# Patient Record
Sex: FEMALE | Race: BLACK OR AFRICAN AMERICAN | Employment: FULL TIME | URBAN - METROPOLITAN AREA
[De-identification: names, ages, dates, MRNs, and addresses within clinical notes are randomized per-mention and may not be internally consistent; named-entity substitution may affect disease eponyms.]

---
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
## 2022-03-08 ENCOUNTER — HOSPITAL ENCOUNTER (EMERGENCY)
Age: 22
Discharge: HOME | End: 2022-03-08
Payer: COMMERCIAL

## 2022-03-08 VITALS
RESPIRATION RATE: 16 BRPM | DIASTOLIC BLOOD PRESSURE: 82 MMHG | WEIGHT: 212 LBS | SYSTOLIC BLOOD PRESSURE: 142 MMHG | OXYGEN SATURATION: 100 % | TEMPERATURE: 100.7 F | HEART RATE: 120 BPM

## 2022-03-08 DIAGNOSIS — J10.1 INFLUENZA A H1N1 INFECTION: Primary | ICD-10-CM

## 2022-03-08 LAB
FLU A ANTIGEN: POSITIVE
INFLUENZA B AG, EIA: NEGATIVE

## 2022-03-08 PROCEDURE — 99202 OFFICE O/P NEW SF 15 MIN: CPT | Performed by: NURSE PRACTITIONER

## 2022-03-08 PROCEDURE — 99203 OFFICE O/P NEW LOW 30 MIN: CPT

## 2022-03-08 PROCEDURE — 87804 INFLUENZA ASSAY W/OPTIC: CPT

## 2022-03-08 RX ORDER — OSELTAMIVIR PHOSPHATE 75 MG/1
75 CAPSULE ORAL 2 TIMES DAILY
Qty: 10 CAPSULE | Refills: 0 | Status: SHIPPED | OUTPATIENT
Start: 2022-03-08 | End: 2022-03-13

## 2022-03-08 RX ORDER — DIPHENHYDRAMINE HYDROCHLORIDE 25 MG/1
25 CAPSULE ORAL DAILY
Qty: 30 TABLET | Refills: 3 | Status: ON HOLD | OUTPATIENT
Start: 2022-03-08 | End: 2022-05-12

## 2022-03-08 ASSESSMENT — PAIN DESCRIPTION - FREQUENCY: FREQUENCY: CONTINUOUS

## 2022-03-08 ASSESSMENT — ENCOUNTER SYMPTOMS
ABDOMINAL PAIN: 0
WHEEZING: 0
SINUS CONGESTION: 0
COLOR CHANGE: 0
CONSTIPATION: 0
SORE THROAT: 0
EYE DISCHARGE: 0
SINUS PRESSURE: 0
VOMITING: 1
RHINORRHEA: 1
NAUSEA: 1
DIARRHEA: 0
SHORTNESS OF BREATH: 0
COUGH: 1
CHEST TIGHTNESS: 0
SINUS PAIN: 0

## 2022-03-08 ASSESSMENT — PAIN DESCRIPTION - LOCATION: LOCATION: HEAD

## 2022-03-08 ASSESSMENT — PAIN DESCRIPTION - PAIN TYPE: TYPE: ACUTE PAIN

## 2022-03-08 ASSESSMENT — PAIN SCALES - GENERAL: PAINLEVEL_OUTOF10: 3

## 2022-03-08 ASSESSMENT — PAIN DESCRIPTION - DESCRIPTORS: DESCRIPTORS: ACHING

## 2022-03-08 NOTE — ED NOTES
Pt verbalized discharge instructions. Pt informed to go to ER if develop chest pain, shortness of breath or abdominal pain. Pt ambulatory out in stable condition. Assessment unchanged.        Leverette Homans, RN  03/08/22 5298

## 2022-03-08 NOTE — ED PROVIDER NOTES
[facility]  Urgent Care Encounter       CHIEF COMPLAINT       Chief Complaint   Patient presents with    Cough    Headache       Nurses Notes reviewed and I agree except as noted in the HPI. HISTORY OF PRESENT ILLNESS   Opal Arshad is a 24 y.o. female who presents     The history is provided by the patient. No  was used. Cough  Cough characteristics:  Productive and harsh  Sputum characteristics:  Green  Severity:  Moderate  Smoker: no    Context: sick contacts    Context: not exposure to allergens and not occupational exposure    Relieved by:  Nothing  Worsened by:  Nothing  Ineffective treatments:  Cough suppressants and fluids  Associated symptoms: chills, diaphoresis, fever, headaches and rhinorrhea    Associated symptoms: no chest pain, no ear fullness, no ear pain, no eye discharge, no myalgias, no rash, no shortness of breath, no sinus congestion, no sore throat and no wheezing    Fever:     Duration:  2 days    Temp source:  Subjective    Progression:  Waxing and waning      REVIEW OF SYSTEMS     Review of Systems   Constitutional: Positive for chills, diaphoresis, fatigue and fever. HENT: Positive for congestion and rhinorrhea. Negative for ear discharge, ear pain, postnasal drip, sinus pressure, sinus pain, sneezing and sore throat. Eyes: Negative for discharge. Respiratory: Positive for cough. Negative for chest tightness, shortness of breath and wheezing. Cardiovascular: Negative for chest pain, palpitations and leg swelling. Gastrointestinal: Positive for nausea and vomiting. Negative for abdominal pain, constipation and diarrhea. Genitourinary: Negative for pelvic pain, vaginal bleeding, vaginal discharge and vaginal pain. Musculoskeletal: Negative for arthralgias and myalgias. Skin: Negative for color change and rash. Neurological: Positive for headaches. Negative for dizziness and light-headedness.    All other systems reviewed and are negative. PAST MEDICAL HISTORY         Diagnosis Date    Allergic     Hypertension        SURGICALHISTORY     Patient  has a past surgical history that includes Eye surgery (Left, 2002). CURRENT MEDICATIONS       Discharge Medication List as of 3/8/2022  4:04 PM      CONTINUE these medications which have NOT CHANGED    Details   Prenatal Vit-Fe Fumarate-FA (PRENATAL 1+1 PO) Take by mouthHistorical Med             ALLERGIES     Patient is is allergic to bactrim [sulfamethoxazole-trimethoprim], lisinopril, and seasonal.    Patients   There is no immunization history on file for this patient. FAMILY HISTORY     Patient's family history includes Diabetes in an other family member; Heart Disease in her maternal grandfather; High Blood Pressure in her father, paternal grandmother, and another family member. SOCIAL HISTORY     Patient  reports that she has never smoked. She has never used smokeless tobacco. She reports that she does not drink alcohol and does not use drugs. PHYSICAL EXAM     ED TRIAGE VITALS  BP: (!) 142/82, Temp: 100.7 °F (38.2 °C), Pulse: 120, Resp: 16, SpO2: 100 %,Estimated body mass index is 34.23 kg/m² as calculated from the following:    Height as of 5/8/17: 5' 4.25\" (1.632 m). Weight as of 5/8/17: 201 lb (91.2 kg). ,No LMP recorded. Patient is pregnant. Physical Exam  Constitutional:       General: She is not in acute distress. Appearance: Normal appearance. She is obese. She is not ill-appearing, toxic-appearing or diaphoretic. HENT:      Head: Normocephalic. Right Ear: Tympanic membrane, ear canal and external ear normal.      Left Ear: Tympanic membrane, ear canal and external ear normal.      Nose: Rhinorrhea present. Mouth/Throat:      Mouth: Mucous membranes are moist.      Pharynx: No oropharyngeal exudate or posterior oropharyngeal erythema. Eyes:      General:         Right eye: No discharge. Left eye: No discharge. Extraocular Movements: Extraocular movements intact. Pupils: Pupils are equal, round, and reactive to light. Cardiovascular:      Rate and Rhythm: Normal rate and regular rhythm. Heart sounds: Normal heart sounds. No murmur heard. No friction rub. No gallop. Pulmonary:      Effort: No respiratory distress. Breath sounds: Normal breath sounds. No stridor or decreased air movement. No wheezing, rhonchi or rales. Chest:      Chest wall: No tenderness. Abdominal:      General: Abdomen is flat. Bowel sounds are normal. There is no distension. Palpations: Abdomen is soft. Tenderness: There is no abdominal tenderness. Musculoskeletal:      Cervical back: Normal range of motion. Skin:     General: Skin is warm and dry. Capillary Refill: Capillary refill takes less than 2 seconds. Neurological:      General: No focal deficit present. Mental Status: She is alert and oriented to person, place, and time. Mental status is at baseline. Psychiatric:         Mood and Affect: Mood normal.         Behavior: Behavior normal.         Thought Content: Thought content normal.         Judgment: Judgment normal.         DIAGNOSTIC RESULTS     Labs:  Results for orders placed or performed during the hospital encounter of 03/08/22   Rapid influenza A/B antigens   Result Value Ref Range    Flu A Antigen Positive (A) NEGATIVE    Influenza B Ag, EIA Negative NEGATIVE       IMAGING:    No orders to display         EKG:      URGENT CARE COURSE:     Vitals:    03/08/22 1522   BP: (!) 142/82   Pulse: 120   Resp: 16   Temp: 100.7 °F (38.2 °C)   TempSrc: Oral   SpO2: 100%   Weight: 212 lb (96.2 kg)       Medications - No data to display         PROCEDURES:  None    FINAL IMPRESSION      1. Influenza A H1N1 infection          DISPOSITION/ PLAN      The patient was advised to drink plenty of fluids. They may take Tylenol for fever or body aches. Take prescribed medication as directed.   Refer to handout regarding OTC medications safe for pregnancy. Pt is advised to go to ER if symptoms worsen, new symptoms develop, high fever >102, vomiting, breathing difficulty, lethargy, chest pain or shortness of breath Dial 911. The patient or patient's representative is agreeable to the treatment plan they're advised to follow-up with her primary care provider in one week for reevaluation.       PATIENT REFERRED TO:  JAMILA Mueller CNP  [address] [facility] [id number]      DISCHARGE MEDICATIONS:  Discharge Medication List as of 3/8/2022  4:04 PM      START taking these medications    Details   oseltamivir (TAMIFLU) 75 MG capsule Take 1 capsule by mouth 2 times daily for 5 days, Disp-10 capsule, R-0Normal      vitamin B-6 (PYRIDOXINE) 25 MG tablet Take 1 tablet by mouth daily, Disp-30 tablet, R-3Normal             Discharge Medication List as of 3/8/2022  4:04 PM      STOP taking these medications       Norgestim-Eth Estrad Triphasic 0.18/0.215/0.25 MG-25 MCG TABS Comments:   Reason for Stopping:         loratadine (CLARITIN) 10 MG tablet Comments:   Reason for Stopping:         UNKNOWN TO PATIENT Comments:   Reason for Stopping:               Discharge Medication List as of 3/8/2022  4:04 PM          JAMILA Glaser CNP    (Please note that portions of this note were completed with a voice recognition program. Efforts were made to edit the dictations but occasionally words are mis-transcribed.)         JAMILA Glaser CNP  03/08/22 [id number]

## 2022-03-08 NOTE — ED TRIAGE NOTES
Pt ambulatory into esuc with c/o headache and cough for the past day. Pt states she is 27 weeks pregnant and has not called OB. Pt states baby has been active. Pt states headache pain 3.

## 2022-03-08 NOTE — Clinical Note
Lucinda Farias was seen and treated in our emergency department on 3/8/2022. She may return to work on 03/10/2022. If you have any questions or concerns, please don't hesitate to call.       JAMILA Sosa - CNP

## 2022-05-12 ENCOUNTER — APPOINTMENT (OUTPATIENT)
Dept: LABOR AND DELIVERY | Age: 22
DRG: 540 | End: 2022-05-12
Payer: COMMERCIAL

## 2022-05-12 ENCOUNTER — HOSPITAL ENCOUNTER (INPATIENT)
Age: 22
LOS: 4 days | Discharge: HOME | DRG: 540 | End: 2022-05-16
Attending: STUDENT IN AN ORGANIZED HEALTH CARE EDUCATION/TRAINING PROGRAM | Admitting: STUDENT IN AN ORGANIZED HEALTH CARE EDUCATION/TRAINING PROGRAM
Payer: COMMERCIAL

## 2022-05-12 DIAGNOSIS — Z98.891 S/P C-SECTION: Primary | ICD-10-CM

## 2022-05-12 PROBLEM — O16.3 HYPERTENSION COMPLICATING PREGNANCY, THIRD TRIMESTER: Status: ACTIVE | Noted: 2022-05-12

## 2022-05-12 PROBLEM — Z34.90 ENCOUNTER FOR ELECTIVE INDUCTION OF LABOR: Status: ACTIVE | Noted: 2022-05-12

## 2022-05-12 LAB
ABO: NORMAL
ALBUMIN SERPL-MCNC: 3.6 G/DL (ref 3.5–5.1)
ALP BLD-CCNC: 104 U/L (ref 38–126)
ALT SERPL-CCNC: 11 U/L (ref 11–66)
AMPHETAMINE+METHAMPHETAMINE URINE SCREEN: NEGATIVE
ANION GAP SERPL CALCULATED.3IONS-SCNC: 14 MEQ/L (ref 8–16)
ANTIBODY SCREEN: NORMAL
AST SERPL-CCNC: 20 U/L (ref 5–40)
BARBITURATE QUANTITATIVE URINE: NEGATIVE
BASOPHILS # BLD: 0.2 %
BASOPHILS ABSOLUTE: 0 THOU/MM3 (ref 0–0.1)
BENZODIAZEPINE QUANTITATIVE URINE: NEGATIVE
BILIRUB SERPL-MCNC: 0.2 MG/DL (ref 0.3–1.2)
BUN BLDV-MCNC: 8 MG/DL (ref 7–22)
CALCIUM SERPL-MCNC: 8.8 MG/DL (ref 8.5–10.5)
CANNABINOID QUANTITATIVE URINE: POSITIVE
CHLORIDE BLD-SCNC: 104 MEQ/L (ref 98–111)
CO2: 20 MEQ/L (ref 23–33)
COCAINE METABOLITE QUANTITATIVE URINE: NEGATIVE
CREAT SERPL-MCNC: 0.4 MG/DL (ref 0.4–1.2)
CREATININE URINE: 85.4 MG/DL
EOSINOPHIL # BLD: 0.8 %
EOSINOPHILS ABSOLUTE: 0.1 THOU/MM3 (ref 0–0.4)
ERYTHROCYTE [DISTWIDTH] IN BLOOD BY AUTOMATED COUNT: 13.6 % (ref 11.5–14.5)
ERYTHROCYTE [DISTWIDTH] IN BLOOD BY AUTOMATED COUNT: 38.6 FL (ref 35–45)
GFR SERPL CREATININE-BSD FRML MDRD: > 90 ML/MIN/1.73M2
GLUCOSE BLD-MCNC: 106 MG/DL (ref 70–108)
HCT VFR BLD CALC: 33.3 % (ref 37–47)
HEMOGLOBIN: 10.7 GM/DL (ref 12–16)
IMMATURE GRANS (ABS): 0.03 THOU/MM3 (ref 0–0.07)
IMMATURE GRANULOCYTES: 0.3 %
LYMPHOCYTES # BLD: 20.1 %
LYMPHOCYTES ABSOLUTE: 2.2 THOU/MM3 (ref 1–4.8)
MCH RBC QN AUTO: 25.6 PG (ref 26–33)
MCHC RBC AUTO-ENTMCNC: 32.1 GM/DL (ref 32.2–35.5)
MCV RBC AUTO: 79.7 FL (ref 81–99)
MONOCYTES # BLD: 5.7 %
MONOCYTES ABSOLUTE: 0.6 THOU/MM3 (ref 0.4–1.3)
NUCLEATED RED BLOOD CELLS: 0 /100 WBC
OPIATES, URINE: NEGATIVE
OXYCODONE: NEGATIVE
PHENCYCLIDINE QUANTITATIVE URINE: NEGATIVE
PLATELET # BLD: 287 THOU/MM3 (ref 130–400)
PMV BLD AUTO: 10.6 FL (ref 9.4–12.4)
POTASSIUM SERPL-SCNC: 3.5 MEQ/L (ref 3.5–5.2)
PROT/CREAT RATIO, UR: 0.18
PROTEIN, URINE: 15 MG/DL
RBC # BLD: 4.18 MILL/MM3 (ref 4.2–5.4)
RH FACTOR: NORMAL
SEG NEUTROPHILS: 72.9 %
SEGMENTED NEUTROPHILS ABSOLUTE COUNT: 8 THOU/MM3 (ref 1.8–7.7)
SODIUM BLD-SCNC: 138 MEQ/L (ref 135–145)
TOTAL PROTEIN: 6 G/DL (ref 6.1–8)
WBC # BLD: 11 THOU/MM3 (ref 4.8–10.8)

## 2022-05-12 PROCEDURE — 80307 DRUG TEST PRSMV CHEM ANLYZR: CPT

## 2022-05-12 PROCEDURE — 85025 COMPLETE CBC W/AUTO DIFF WBC: CPT

## 2022-05-12 PROCEDURE — 82570 ASSAY OF URINE CREATININE: CPT

## 2022-05-12 PROCEDURE — 86900 BLOOD TYPING SEROLOGIC ABO: CPT

## 2022-05-12 PROCEDURE — 1220000001 HC SEMI PRIVATE L&D R&B

## 2022-05-12 PROCEDURE — 84156 ASSAY OF PROTEIN URINE: CPT

## 2022-05-12 PROCEDURE — 6370000000 HC RX 637 (ALT 250 FOR IP): Performed by: STUDENT IN AN ORGANIZED HEALTH CARE EDUCATION/TRAINING PROGRAM

## 2022-05-12 PROCEDURE — 2580000003 HC RX 258: Performed by: STUDENT IN AN ORGANIZED HEALTH CARE EDUCATION/TRAINING PROGRAM

## 2022-05-12 PROCEDURE — 86850 RBC ANTIBODY SCREEN: CPT

## 2022-05-12 PROCEDURE — 86901 BLOOD TYPING SEROLOGIC RH(D): CPT

## 2022-05-12 PROCEDURE — 80053 COMPREHEN METABOLIC PANEL: CPT

## 2022-05-12 PROCEDURE — 6360000002 HC RX W HCPCS

## 2022-05-12 PROCEDURE — 6360000002 HC RX W HCPCS: Performed by: STUDENT IN AN ORGANIZED HEALTH CARE EDUCATION/TRAINING PROGRAM

## 2022-05-12 PROCEDURE — 2500000003 HC RX 250 WO HCPCS: Performed by: STUDENT IN AN ORGANIZED HEALTH CARE EDUCATION/TRAINING PROGRAM

## 2022-05-12 PROCEDURE — 86592 SYPHILIS TEST NON-TREP QUAL: CPT

## 2022-05-12 RX ORDER — ONDANSETRON 2 MG/ML
8 INJECTION INTRAMUSCULAR; INTRAVENOUS EVERY 6 HOURS PRN
Status: DISCONTINUED | OUTPATIENT
Start: 2022-05-12 | End: 2022-05-14 | Stop reason: HOSPADM

## 2022-05-12 RX ORDER — IBUPROFEN 800 MG/1
800 TABLET ORAL EVERY 8 HOURS PRN
Status: DISCONTINUED | OUTPATIENT
Start: 2022-05-12 | End: 2022-05-16 | Stop reason: HOSPADM

## 2022-05-12 RX ORDER — SODIUM CHLORIDE, SODIUM LACTATE, POTASSIUM CHLORIDE, CALCIUM CHLORIDE 600; 310; 30; 20 MG/100ML; MG/100ML; MG/100ML; MG/100ML
INJECTION, SOLUTION INTRAVENOUS CONTINUOUS
Status: DISCONTINUED | OUTPATIENT
Start: 2022-05-12 | End: 2022-05-14

## 2022-05-12 RX ORDER — SODIUM CHLORIDE 0.9 % (FLUSH) 0.9 %
5-40 SYRINGE (ML) INJECTION PRN
Status: DISCONTINUED | OUTPATIENT
Start: 2022-05-12 | End: 2022-05-14

## 2022-05-12 RX ORDER — BUTORPHANOL TARTRATE 1 MG/ML
1 INJECTION, SOLUTION INTRAMUSCULAR; INTRAVENOUS
Status: DISCONTINUED | OUTPATIENT
Start: 2022-05-12 | End: 2022-05-14 | Stop reason: HOSPADM

## 2022-05-12 RX ORDER — SEVOFLURANE 250 ML/250ML
1 LIQUID RESPIRATORY (INHALATION) CONTINUOUS PRN
Status: DISCONTINUED | OUTPATIENT
Start: 2022-05-12 | End: 2022-05-14 | Stop reason: HOSPADM

## 2022-05-12 RX ORDER — SODIUM CHLORIDE, SODIUM LACTATE, POTASSIUM CHLORIDE, AND CALCIUM CHLORIDE .6; .31; .03; .02 G/100ML; G/100ML; G/100ML; G/100ML
1000 INJECTION, SOLUTION INTRAVENOUS PRN
Status: DISCONTINUED | OUTPATIENT
Start: 2022-05-12 | End: 2022-05-14 | Stop reason: HOSPADM

## 2022-05-12 RX ORDER — CARBOPROST TROMETHAMINE 250 UG/ML
250 INJECTION, SOLUTION INTRAMUSCULAR PRN
Status: DISCONTINUED | OUTPATIENT
Start: 2022-05-12 | End: 2022-05-14 | Stop reason: HOSPADM

## 2022-05-12 RX ORDER — LABETALOL 100 MG/1
100 TABLET, FILM COATED ORAL 2 TIMES DAILY
Status: ON HOLD | COMMUNITY
End: 2022-05-16 | Stop reason: HOSPADM

## 2022-05-12 RX ORDER — LABETALOL 20 MG/4 ML (5 MG/ML) INTRAVENOUS SYRINGE
40
Status: COMPLETED | OUTPATIENT
Start: 2022-05-12 | End: 2022-05-12

## 2022-05-12 RX ORDER — SODIUM CHLORIDE, SODIUM LACTATE, POTASSIUM CHLORIDE, AND CALCIUM CHLORIDE .6; .31; .03; .02 G/100ML; G/100ML; G/100ML; G/100ML
500 INJECTION, SOLUTION INTRAVENOUS PRN
Status: DISCONTINUED | OUTPATIENT
Start: 2022-05-12 | End: 2022-05-14 | Stop reason: HOSPADM

## 2022-05-12 RX ORDER — SODIUM CHLORIDE, SODIUM LACTATE, POTASSIUM CHLORIDE, CALCIUM CHLORIDE 600; 310; 30; 20 MG/100ML; MG/100ML; MG/100ML; MG/100ML
INJECTION, SOLUTION INTRAVENOUS CONTINUOUS
Status: DISCONTINUED | OUTPATIENT
Start: 2022-05-12 | End: 2022-05-13

## 2022-05-12 RX ORDER — METHYLERGONOVINE MALEATE 0.2 MG/ML
200 INJECTION INTRAVENOUS PRN
Status: DISCONTINUED | OUTPATIENT
Start: 2022-05-12 | End: 2022-05-14 | Stop reason: HOSPADM

## 2022-05-12 RX ORDER — ACETAMINOPHEN 325 MG/1
650 TABLET ORAL EVERY 4 HOURS PRN
Status: DISCONTINUED | OUTPATIENT
Start: 2022-05-12 | End: 2022-05-14 | Stop reason: HOSPADM

## 2022-05-12 RX ORDER — PENICILLIN G 3000000 [IU]/50ML
3 INJECTION, SOLUTION INTRAVENOUS EVERY 4 HOURS
Status: DISCONTINUED | OUTPATIENT
Start: 2022-05-12 | End: 2022-05-14 | Stop reason: HOSPADM

## 2022-05-12 RX ORDER — MORPHINE SULFATE 2 MG/ML
2 INJECTION, SOLUTION INTRAMUSCULAR; INTRAVENOUS
Status: DISCONTINUED | OUTPATIENT
Start: 2022-05-12 | End: 2022-05-14 | Stop reason: HOSPADM

## 2022-05-12 RX ORDER — LIDOCAINE HYDROCHLORIDE 10 MG/ML
30 INJECTION, SOLUTION EPIDURAL; INFILTRATION; INTRACAUDAL; PERINEURAL PRN
Status: DISCONTINUED | OUTPATIENT
Start: 2022-05-12 | End: 2022-05-14 | Stop reason: HOSPADM

## 2022-05-12 RX ORDER — LABETALOL 200 MG/1
200 TABLET, FILM COATED ORAL EVERY 12 HOURS SCHEDULED
Status: DISCONTINUED | OUTPATIENT
Start: 2022-05-12 | End: 2022-05-16 | Stop reason: HOSPADM

## 2022-05-12 RX ORDER — MORPHINE SULFATE 4 MG/ML
4 INJECTION, SOLUTION INTRAMUSCULAR; INTRAVENOUS
Status: DISCONTINUED | OUTPATIENT
Start: 2022-05-12 | End: 2022-05-14 | Stop reason: HOSPADM

## 2022-05-12 RX ORDER — LABETALOL 20 MG/4 ML (5 MG/ML) INTRAVENOUS SYRINGE
20
Status: COMPLETED | OUTPATIENT
Start: 2022-05-12 | End: 2022-05-12

## 2022-05-12 RX ORDER — SODIUM CHLORIDE 0.9 % (FLUSH) 0.9 %
5-40 SYRINGE (ML) INJECTION EVERY 12 HOURS SCHEDULED
Status: DISCONTINUED | OUTPATIENT
Start: 2022-05-12 | End: 2022-05-13

## 2022-05-12 RX ORDER — MISOPROSTOL 200 UG/1
1000 TABLET ORAL PRN
Status: DISCONTINUED | OUTPATIENT
Start: 2022-05-12 | End: 2022-05-14 | Stop reason: HOSPADM

## 2022-05-12 RX ORDER — LABETALOL 20 MG/4 ML (5 MG/ML) INTRAVENOUS SYRINGE
80
Status: ACTIVE | OUTPATIENT
Start: 2022-05-12 | End: 2022-05-12

## 2022-05-12 RX ORDER — SODIUM CHLORIDE 9 MG/ML
INJECTION, SOLUTION INTRAVENOUS PRN
Status: DISCONTINUED | OUTPATIENT
Start: 2022-05-12 | End: 2022-05-14

## 2022-05-12 RX ORDER — TERBUTALINE SULFATE 1 MG/ML
0.25 INJECTION, SOLUTION SUBCUTANEOUS
Status: ACTIVE | OUTPATIENT
Start: 2022-05-12 | End: 2022-05-12

## 2022-05-12 RX ORDER — DIPHENHYDRAMINE HYDROCHLORIDE 50 MG/ML
25 INJECTION INTRAMUSCULAR; INTRAVENOUS EVERY 4 HOURS PRN
Status: DISCONTINUED | OUTPATIENT
Start: 2022-05-12 | End: 2022-05-14 | Stop reason: HOSPADM

## 2022-05-12 RX ORDER — HYDRALAZINE HYDROCHLORIDE 20 MG/ML
10 INJECTION INTRAMUSCULAR; INTRAVENOUS
Status: ACTIVE | OUTPATIENT
Start: 2022-05-12 | End: 2022-05-12

## 2022-05-12 RX ADMIN — PENICILLIN G 3 MILLION UNITS: 3000000 INJECTION, SOLUTION INTRAVENOUS at 21:34

## 2022-05-12 RX ADMIN — BUTORPHANOL TARTRATE 1 MG: 1 INJECTION, SOLUTION INTRAMUSCULAR; INTRAVENOUS at 21:25

## 2022-05-12 RX ADMIN — LABETALOL HYDROCHLORIDE 200 MG: 200 TABLET, FILM COATED ORAL at 19:18

## 2022-05-12 RX ADMIN — LABETALOL 20 MG/4 ML (5 MG/ML) INTRAVENOUS SYRINGE 40 MG: at 21:27

## 2022-05-12 RX ADMIN — SODIUM CHLORIDE, POTASSIUM CHLORIDE, SODIUM LACTATE AND CALCIUM CHLORIDE: 600; 310; 30; 20 INJECTION, SOLUTION INTRAVENOUS at 22:58

## 2022-05-12 RX ADMIN — SODIUM CHLORIDE, POTASSIUM CHLORIDE, SODIUM LACTATE AND CALCIUM CHLORIDE: 600; 310; 30; 20 INJECTION, SOLUTION INTRAVENOUS at 17:30

## 2022-05-12 RX ADMIN — Medication 1 MILLI-UNITS/MIN: at 19:00

## 2022-05-12 RX ADMIN — DEXTROSE MONOHYDRATE 5 MILLION UNITS: 5 INJECTION INTRAVENOUS at 18:16

## 2022-05-12 RX ADMIN — LABETALOL 20 MG/4 ML (5 MG/ML) INTRAVENOUS SYRINGE 20 MG: at 20:34

## 2022-05-12 RX ADMIN — ONDANSETRON 8 MG: 2 INJECTION INTRAMUSCULAR; INTRAVENOUS at 20:52

## 2022-05-12 ASSESSMENT — PAIN SCALES - GENERAL: PAINLEVEL_OUTOF10: 7

## 2022-05-12 NOTE — FLOWSHEET NOTE
Dr Lucian Rush at bedside ying ripening balloon placed. Balloon filled with 60 ml NS. Infusion of 0.9 NS started at 60 ml/hr. Ying secured to pts leg. Pt tolerated well.

## 2022-05-12 NOTE — FLOWSHEET NOTE
Dr Michael Abarca informed of BP's. States she is going to have pt take her labetalol but is going to increase it to 200 mg BID. States may take her own home meds.

## 2022-05-12 NOTE — FLOWSHEET NOTE
Dr Darin Abbott informed of BP's from admission till current BP of 150/97. States to have pt take her home labetalol now.

## 2022-05-12 NOTE — FLOWSHEET NOTE
Dr Colleen Pena in department informed of pts arrival for induction of labor at 37 weeks for [facility]. BP's from admission reviewed. Reactive fetal tracing obtained. +GBS.   Orders received

## 2022-05-12 NOTE — H&P
[address] [address]  History and Physical Update    Pt Name: Javan Segundo  MRN: [id number]  [place]: 2000  Date of evaluation: 2022    [] I have examined the patient and reviewed the H&P/Consult and there are no changes to the patient or plans. [x] I have examined the patient and reviewed the H&P/Consult and have noted the following changes:   22yo  @37wks presents for IOL due to worsening cHTN. Was just started on Labetalol earlier this week. Pt is asymptomatic. /mod tessie/acels/no decels. SVE: FT/L/H. Intracervical ying ripening balloon placed in usual sterile fashion. Pitocin up to 4mu/min until ying comes out, then increase per protocol. PCN for GBS positive. Pre-E labs ordered along with usual admission labs. Will increase Labetalol to 200mg BID. Last dose was 9am today. Discussion with the patient and/ or family for proposed care, treatment, services; benefits, risks, side effects; likelihood of achieving goals and potential problems that may occur during recuperation was had and all questions were answered. Discussion with the patient and/ or family of reasonable alternatives to the proposed care, treatment, services and the discussion of the risks, benefits, side effects related to the alternatives and the risk related to not receiving the proposed care treatment services was also had and all questions were answered. If this is for an elective surgical procedure then The patient was counseled at length about the risks of fredrick Covid-19 during their perioperative period and any recovery window from their procedure. The patient was made aware that fredrick Covid-19  may worsen their prognosis for recovering from their procedure  and lend to a higher morbidity and/or mortality risk. All material risks, benefits, and reasonable alternatives including postponing the procedure were discussed.  The patient  does wish to proceed with the procedure at this time.             Gladys Vee DO  Electronically signed 5/12/2022 at 6:38 PM

## 2022-05-12 NOTE — FLOWSHEET NOTE
Pt presents to [facility] for induction of labor at 37 week for [facility]. Pt denies any vaginal bleeding or leaking of fluids, fetal movement noted per pt. Pt oriented to room and call system. Patient to bathroom to void, informed of maternal drug testing policy in place on all laboring patients. Verbal consent received, paper consent to be signed and urine to be sent.

## 2022-05-12 NOTE — PLAN OF CARE
Problem: Vaginal Birth or  Section  Goal: Fetal and maternal status remain reassuring during the birth process  Description:  Birth OB-Pregnancy care plan goal which identifies if the fetal and maternal status remain reassuring during the birth process  Outcome: Progressing  Flowsheets (Taken 2022 1720)  Fetal and Maternal Status Remain Reassuring During the Birth Process:   Monitor vital signs   Monitor uterine activity   Monitor fetal heart rate  Note: Vitals stable, pt afebrile, +GBS, PCN to be given  Fetal Heart Tones remain reassuring. Continuous EFM in place.    Skelp in place to trace any contractions pt has

## 2022-05-12 NOTE — PLAN OF CARE
Problem: Vaginal Birth or  Section  Goal: Fetal and maternal status remain reassuring during the birth process  Description:  Birth OB-Pregnancy care plan goal which identifies if the fetal and maternal status remain reassuring during the birth process  2022 by Flora Nicole RN  Outcome: Progressing  [address] (Taken 2022 1720 by Ophelia Holder, RN)  Fetal and Maternal Status Remain Reassuring During the Birth Process:   Monitor vital signs   Monitor uterine activity   Monitor fetal heart rate  Note: Fetal Heart Tones remain reassuring. Continuous EFM in place. Dortches in place to monitor contractions. Problem: Infection - Adult  Goal: Absence of infection during hospitalization  2022 by Flora Nicole RN  Outcome: Progressing  Flowsheets (Taken 2022 by Ophelia Holder, RN)  Absence of infection during hospitalization:   Assess and monitor for signs and symptoms of infection   Administer medications as ordered   Instruct and encourage patient and family to use good hand hygiene technique  Note: Vitals stable, pt afebrile, +GBS - PCN given per GBS protocol order       Problem: Safety - Adult  Goal: Free from fall injury  2022 by Flora Nicole RN  Outcome: Progressing  Flowsheets (Taken 2022 by Ophelia Holder, RN)  Free From Fall Injury: Instruct family/caregiver on patient safety  Note: Pt. Remains free from falls at this time. IV infusing per order. RN encouraged pt. To call for assistance to BR. Side rails up X2. Call light within reach. S.O. At bedside. RN will continue to provide for a safe environment.        Problem: Discharge Planning  Goal: Discharge to home or other facility with appropriate resources  2022 by Flora Nicole RN  Outcome: Progressing  Flowsheets (Taken 2022 by Ophelia Holder RN)  Discharge to home or other facility with appropriate resources: Identify barriers to discharge with patient and caregiver  Note: Pt aware

## 2022-05-13 ENCOUNTER — ANESTHESIA (OUTPATIENT)
Dept: LABOR AND DELIVERY | Age: 22
DRG: 540 | End: 2022-05-13
Payer: COMMERCIAL

## 2022-05-13 ENCOUNTER — ANESTHESIA EVENT (OUTPATIENT)
Dept: LABOR AND DELIVERY | Age: 22
DRG: 540 | End: 2022-05-13
Payer: COMMERCIAL

## 2022-05-13 LAB — RPR: NONREACTIVE

## 2022-05-13 PROCEDURE — 3700000025 EPIDURAL BLOCK: Performed by: ANESTHESIOLOGY

## 2022-05-13 PROCEDURE — 2500000003 HC RX 250 WO HCPCS: Performed by: ANESTHESIOLOGY

## 2022-05-13 PROCEDURE — 6360000002 HC RX W HCPCS: Performed by: STUDENT IN AN ORGANIZED HEALTH CARE EDUCATION/TRAINING PROGRAM

## 2022-05-13 PROCEDURE — 10907ZC DRAINAGE OF AMNIOTIC FLUID, THERAPEUTIC FROM PRODUCTS OF CONCEPTION, VIA NATURAL OR ARTIFICIAL OPENING: ICD-10-PCS | Performed by: OBSTETRICS & GYNECOLOGY

## 2022-05-13 PROCEDURE — 2580000003 HC RX 258: Performed by: STUDENT IN AN ORGANIZED HEALTH CARE EDUCATION/TRAINING PROGRAM

## 2022-05-13 PROCEDURE — 6370000000 HC RX 637 (ALT 250 FOR IP): Performed by: STUDENT IN AN ORGANIZED HEALTH CARE EDUCATION/TRAINING PROGRAM

## 2022-05-13 PROCEDURE — 1220000001 HC SEMI PRIVATE L&D R&B

## 2022-05-13 RX ORDER — CALCIUM CARBONATE 200(500)MG
1000 TABLET,CHEWABLE ORAL ONCE
Status: COMPLETED | OUTPATIENT
Start: 2022-05-13 | End: 2022-05-13

## 2022-05-13 RX ORDER — NALOXONE HYDROCHLORIDE 0.4 MG/ML
INJECTION, SOLUTION INTRAMUSCULAR; INTRAVENOUS; SUBCUTANEOUS PRN
Status: DISCONTINUED | OUTPATIENT
Start: 2022-05-13 | End: 2022-05-14 | Stop reason: HOSPADM

## 2022-05-13 RX ORDER — ONDANSETRON 2 MG/ML
4 INJECTION INTRAMUSCULAR; INTRAVENOUS EVERY 6 HOURS PRN
Status: DISCONTINUED | OUTPATIENT
Start: 2022-05-13 | End: 2022-05-13 | Stop reason: SDUPTHER

## 2022-05-13 RX ADMIN — LABETALOL HYDROCHLORIDE 200 MG: 200 TABLET, FILM COATED ORAL at 21:05

## 2022-05-13 RX ADMIN — SODIUM CHLORIDE, POTASSIUM CHLORIDE, SODIUM LACTATE AND CALCIUM CHLORIDE: 600; 310; 30; 20 INJECTION, SOLUTION INTRAVENOUS at 23:34

## 2022-05-13 RX ADMIN — PENICILLIN G 3 MILLION UNITS: 3000000 INJECTION, SOLUTION INTRAVENOUS at 09:27

## 2022-05-13 RX ADMIN — PENICILLIN G 3 MILLION UNITS: 3000000 INJECTION, SOLUTION INTRAVENOUS at 05:40

## 2022-05-13 RX ADMIN — LABETALOL HYDROCHLORIDE 200 MG: 200 TABLET, FILM COATED ORAL at 08:57

## 2022-05-13 RX ADMIN — Medication 18 ML/HR: at 17:25

## 2022-05-13 RX ADMIN — SODIUM CHLORIDE, POTASSIUM CHLORIDE, SODIUM LACTATE AND CALCIUM CHLORIDE: 600; 310; 30; 20 INJECTION, SOLUTION INTRAVENOUS at 07:33

## 2022-05-13 RX ADMIN — PENICILLIN G 3 MILLION UNITS: 3000000 INJECTION, SOLUTION INTRAVENOUS at 01:30

## 2022-05-13 RX ADMIN — PENICILLIN G 3 MILLION UNITS: 3000000 INJECTION, SOLUTION INTRAVENOUS at 13:16

## 2022-05-13 RX ADMIN — PENICILLIN G 3 MILLION UNITS: 3000000 INJECTION, SOLUTION INTRAVENOUS at 17:13

## 2022-05-13 RX ADMIN — ONDANSETRON 8 MG: 2 INJECTION INTRAMUSCULAR; INTRAVENOUS at 19:49

## 2022-05-13 RX ADMIN — CALCIUM CARBONATE 1000 MG: 500 TABLET, CHEWABLE ORAL at 14:35

## 2022-05-13 RX ADMIN — PENICILLIN G 3 MILLION UNITS: 3000000 INJECTION, SOLUTION INTRAVENOUS at 21:04

## 2022-05-13 ASSESSMENT — PAIN DESCRIPTION - DESCRIPTORS
DESCRIPTORS: CRAMPING;PRESSURE
DESCRIPTORS: PRESSURE
DESCRIPTORS: CRAMPING;PRESSURE

## 2022-05-13 ASSESSMENT — LIFESTYLE VARIABLES: SMOKING_STATUS: 1

## 2022-05-13 NOTE — FLOWSHEET NOTE
Dr Emilia Ford in department update given. Pit stopped, tums given earlier and pitocin restarted at 9 MU. Pt just received her epidural and is comfortable, VE 4/80/-1. Pitocin now at 12MU.

## 2022-05-13 NOTE — PROGRESS NOTES
S: resting comfortably in bed    O:  Vitals:    22 1520   BP:    Pulse:    Resp:    Temp: 96.8 °F (36 °C)   SpO2:      BP mostly 130-140s/60-90s  Gen: no acute distress    FHTs: 145, mod tessie, +Accels, no decels  Moran: q2-6 min. Category I tracing. Reactive. A: 25 yo  @ 37+1 wks IOL for worsening CHTN  P:  1. PCN for GBS  2. BP mild range  3. con't to work towards delivery.      Thompson Wagner MD  3:52 PM  2022

## 2022-05-13 NOTE — ANESTHESIA PROCEDURE NOTES
Epidural Block    Patient location during procedure: OB  Reason for block: labor epidural  Staffing  Performed: resident/CRNA   Resident/CRNA: JAMILA Jeong CRNA  Preanesthetic Checklist  Completed: patient identified, IV checked, site marked, risks and benefits discussed, surgical consent, monitors and equipment checked, pre-op evaluation, timeout performed, anesthesia consent given, oxygen available and patient being monitored  Epidural  Patient position: sitting  Prep: ChloraPrep  Approach: midline  Location: L3-4  Injection technique: JANIS air  Provider prep: mask and sterile gloves  Needle  Needle type: Tuohy   Needle gauge: 18 G  Needle length: 3.5 in  Needle insertion depth: 7 cm  Catheter type: side hole  Catheter size: 19 G  Catheter at skin depth: 12 cm  Test dose: negativeCatheter Secured: tegaderm and tape  Assessment  Hemodynamics: stable  Attempts: 1Outcomes: patient tolerated procedure well

## 2022-05-13 NOTE — ANESTHESIA PRE PROCEDURE
Department of Anesthesiology  Preprocedure Note       Name:  Edita Alexandra   Age:  24 y.o.  :  2000                                          MRN:  [id number]         Date:  2022      Surgeon: * No surgeons listed *    Procedure: * No procedures listed *    Medications prior to admission:   Prior to Admission medications    Medication Sig Start Date End Date Taking?  Authorizing Provider   labetalol (NORMODYNE) 100 MG tablet Take 100 mg by mouth 2 times daily   Yes Historical Provider, MD   Prenatal Vit-Fe Fumarate-FA (PRENATAL 1+1 PO) Take 2 tablets by mouth daily     Historical Provider, MD       Current medications:    Current Facility-Administered Medications   Medication Dose Route Frequency Provider Last Rate Last Admin    naloxone 0.4 mg in 10 mL sodium chloride syringe   IntraVENous PRN Ermelinda Anibal Heitmeyer, DO        [facility]etron [facility]) injection 4 mg  4 mg IntraVENous Q6H PRN Ermelinda Anibal Heitmeyer, DO        fentaNYL 750 mcg, ropivacaine 0.1% in sodium chloride 0.9% 265mL (OB) epidural  18 mL/hr Epidural Continuous Ermelinda Anibal Heitmeyer, DO 18 mL/hr at 22 1725 18 mL/hr at 22 1725    oxytocin (PITOCIN) 30 units in 500 mL infusion  1 eugene-units/min IntraVENous Continuous Annie L Langhals, DO 12 mL/hr at 22 1639 12 eugene-units/min at 22 1639    lactated ringers infusion   IntraVENous Continuous Annie L Langhals,  mL/hr at 22 0732 Rate Change at 22 0732    lactated ringers bolus  500 mL IntraVENous PRN Annie L Langhals, DO        Or    lactated ringers bolus  1,000 mL IntraVENous PRN Annie L Langhals, DO        lidocaine PF 1 % injection 30 mL  30 mL Other PRN Annie L Langhals, DO        butorphanol (STADOL) injection 1 mg  1 mg IntraVENous Q1H PRN Annie L Langhals, DO   1 mg at 22 2125    nitrous oxide 50% inhalation 1 each  1 each Inhalation Continuous PRN Annie L Langhals, DO        ondansetron (ZOFRAN) injection 8 mg  8 mg IntraVENous Q6H PRN Annie L Langhals, DO   8 mg at 05/12/22 2052    diphenhydrAMINE (BENADRYL) injection 25 mg  25 mg IntraVENous Q4H PRN Annie L Langhals, DO        oxytocin (PITOCIN) 30 units in 500 mL infusion  87.3 eugene-units/min IntraVENous PRN Annie L Langhals, DO        And    oxytocin (PITOCIN) 10 unit bolus from the bag  10 Units IntraVENous PRN Annie L Langhals, DO        methylergonovine (METHERGINE) injection 200 mcg  200 mcg IntraMUSCular PRN Annie L Langhals, DO        carboprost (HEMABATE) injection 250 mcg  250 mcg IntraMUSCular PRN Annie L Langhals, DO        miSOPROStol (CYTOTEC) tablet 1,000 mcg  1,000 mcg Rectal PRN Annie L Langhals, DO        acetaminophen (TYLENOL) tablet 650 mg  650 mg Oral Q4H PRN Annie L Langhals, DO        ibuprofen (ADVIL;MOTRIN) tablet 800 mg  800 mg Oral Q8H PRN Annie L Langhals, DO        morphine (PF) injection 2 mg  2 mg IntraVENous Q2H PRN Annie L Langhals, DO        Or    morphine injection 4 mg  4 mg IntraVENous Q2H PRN Annie L Langhals, DO        witch hazel-glycerin (TUCKS) pad   Topical PRN Annie L Langhals, DO        benzocaine-menthol (DERMOPLAST) 20-0.5 % spray   Topical PRN Annie L Langhals, DO        penicillin G potassium IVPB 3 Million Units  3 Million Units IntraVENous Q4H Annie L Langhals,  mL/hr at 05/13/22 1713 3 Million Units at 05/13/22 1713    labetalol (NORMODYNE) tablet 200 mg  200 mg Oral 2 times per day Annie L Langhals, DO   200 mg at 05/13/22 0857    sodium chloride flush 0.9 % injection 5-40 mL  5-40 mL IntraVENous PRN Annie L Langhals, DO        0.9 % sodium chloride infusion   IntraVENous PRN Annie L Langhals, DO           Allergies:     Allergies   Allergen Reactions    Bactrim [Sulfamethoxazole-Trimethoprim] Hives    Lisinopril Other (See Comments)     Chest discomfort at night    Seasonal Other (See Comments)     Sneezing, coughing       Problem List:    Patient Active Problem List   Diagnosis Code    Essential hypertension I10  Obesity (BMI 30-39. 9) E66.9    Encounter for elective induction of labor Z34.90    Hypertension complicating pregnancy, third trimester O16.3       Past Medical History:        Diagnosis Date    Abnormal Pap smear of cervix     Allergic     Hypertension     on Labetalol 10 mg BID    Trichomonosis        Past Surgical History:        Procedure Laterality Date    EYE SURGERY Left 2002       Social History:    Social History     Tobacco Use    Smoking status: Never Smoker    Smokeless tobacco: Never Used   Substance Use Topics    Alcohol use: No                                Counseling given: Not Answered      Vital Signs (Current):   Vitals:    05/13/22 1605 05/13/22 1635 05/13/22 1726 05/13/22 1729   BP:  128/73 (!) 147/90 (!) 144/85   Pulse:  84 86 92   Resp: 16 16 16 16   Temp:       TempSrc:       SpO2:   99% 99%   Weight:       Height:                                                  BP Readings from Last 3 Encounters:   05/13/22 (!) 144/85   03/08/22 (!) 142/82   05/08/17 129/74 (97 %, Z = 1.88 /  84 %, Z = 0.99)*     *BP percentiles are based on the 2017 AAP Clinical Practice Guideline for girls       NPO Status:                                                                                 BMI:   Wt Readings from Last 3 Encounters:   05/12/22 230 lb (104.3 kg)   03/08/22 212 lb (96.2 kg)   05/08/17 201 lb (91.2 kg) (98 %, Z= 1.99)*     * Growth percentiles are based on [facility] (Girls, 2-20 Years) data. Body mass index is 38.27 kg/m².     CBC:   Lab Results   Component Value Date    WBC 11.0 05/12/2022    RBC 4.18 05/12/2022    RBC 4.50 11/02/2021    HGB 10.7 05/12/2022    HCT 33.3 05/12/2022    MCV 79.7 05/12/2022    RDW 13.5 11/02/2021     05/12/2022       CMP:   Lab Results   Component Value Date     05/12/2022    K 3.5 05/12/2022     05/12/2022    CO2 20 05/12/2022    BUN 8 05/12/2022    CREATININE 0.4 05/12/2022    LABGLOM >90 05/12/2022    GLUCOSE 106 05/12/2022    PROT 6.0 05/12/2022    CALCIUM 8.8 05/12/2022    BILITOT 0.2 05/12/2022    ALKPHOS 104 05/12/2022    AST 20 05/12/2022    ALT 11 05/12/2022       POC Tests: No results for input(s): POCGLU, POCNA, POCK, POCCL, POCBUN, POCHEMO, POCHCT in the last 72 hours. Coags: No results found for: PROTIME, INR, APTT    HCG (If Applicable): No results found for: PREGTESTUR, PREGSERUM, HCG, HCGQUANT     ABGs: No results found for: PHART, PO2ART, OPM1ENO, PXE0KGX, BEART, Q0UKRVTE     Type & Screen (If Applicable):  Lab Results   Component Value Date    LABABO O 11/02/2021    [address] POS 05/12/2022       Drug/Infectious Status (If Applicable):  Lab Results   Component Value Date    HEPCAB Non-Reactive 11/02/2021       COVID-19 Screening (If Applicable): No results found for: COVID19        Anesthesia Evaluation  Patient summary reviewed and Nursing notes reviewed no history of anesthetic complications:   Airway: Mallampati: II  TM distance: >3 FB   Neck ROM: full  Mouth opening: > = 3 FB Dental: normal exam         Pulmonary:   (+) current smoker                          ROS comment: Marijuana use     Cardiovascular:    (+) hypertension:,             Echocardiogram reviewed                  Neuro/Psych:   Negative Neuro/Psych ROS              GI/Hepatic/Renal: Neg GI/Hepatic/Renal ROS            Endo/Other: Negative Endo/Other ROS                    Abdominal:   (+) obese,           Vascular: negative vascular ROS. Other Findings: IUP            Anesthesia Plan      epidural     ASA 2             Anesthetic plan and risks discussed with patient. Plan discussed with attending.                   Kendra Jaquez, APRN - CRNA   5/13/2022

## 2022-05-13 NOTE — PLAN OF CARE
Problem: Vaginal Birth or  Section  Goal: Fetal and maternal status remain reassuring during the birth process  Description:  Birth OB-Pregnancy care plan goal which identifies if the fetal and maternal status remain reassuring during the birth process  2022 by Alessia Luis RN  Outcome: Progressing  Flowsheets (Taken 2022)  Fetal and Maternal Status Remain Reassuring During the Birth Process:   Monitor vital signs   Monitor fetal heart rate   Monitor uterine activity   Monitor labor progression (Vaginal delivery)  Note: Maternal vital signs remain stable, FHT remain reassuring. Abdomen soft non-tender. 2022 by Luis E Aquino RN  Outcome: Progressing  Flowsheets (Taken 2022 by Padmini Marcelino RN)  Fetal and Maternal Status Remain Reassuring During the Birth Process:   Monitor vital signs   Monitor uterine activity   Monitor fetal heart rate  Note: Fetal Heart Tones remain reassuring. Continuous EFM in place. [place] in place to monitor contractions. Problem: Infection - Adult  Goal: Absence of infection during hospitalization  2022 by Alessia Luis RN  Outcome: Progressing  Flowsheets (Taken 2022)  Absence of infection during hospitalization:   Assess and monitor for signs and symptoms of infection   Monitor lab/diagnostic results   Monitor all insertion sites i.e., indwelling lines, tubes and drains  Note: Vitals stable, pt remains afebrile. FHT's remain reassuring, will continue to monitor.    2022 by Luis E Aquino RN  Outcome: Progressing  Flowsheets (Taken 2022 by Padmini Marcelino RN)  Absence of infection during hospitalization:   Assess and monitor for signs and symptoms of infection   Administer medications as ordered   Instruct and encourage patient and family to use good hand hygiene technique  Note: Vitals stable, pt afebrile, +GBS - PCN given per GBS protocol order       Problem: Safety - Adult  Goal: Free from fall injury  5/13/2022 0746 by Martín Wick RN  Outcome: Progressing  Flowsheets (Taken 5/13/2022 0746)  Free From Fall Injury:   Instruct family/caregiver on patient safety   Based on caregiver fall risk screen, instruct family/caregiver to ask for assistance with transferring infant if caregiver noted to have fall risk factors  Note: Pt. Remains free from falls at this time. IV infusing per order. RN encouraged pt. To call for assistance to BR. Side rails up X2. Call light within reach. S.O. At bedside. RN will continue to provide for a safe environment. 5/12/2022 1932 by Frida Perea RN  Outcome: Progressing  Flowsheets (Taken 5/12/2022 1712 by Ryan Coles RN)  Free From Fall Injury: Instruct family/caregiver on patient safety  Note: Pt. Remains free from falls at this time. IV infusing per order. RN encouraged pt. To call for assistance to BR. Side rails up X2. Call light within reach. S.O. At bedside. RN will continue to provide for a safe environment. Problem: Discharge Planning  Goal: Discharge to home or other facility with appropriate resources  5/13/2022 0746 by Martín Wick RN  Outcome: Progressing  Flowsheets (Taken 5/13/2022 0746)  Discharge to home or other facility with appropriate resources:   Identify barriers to discharge with patient and caregiver   Arrange for needed discharge resources and transportation as appropriate  Note: Pt aware of 2hr recovery period in L&D and then transfer to mom/baby for the remainder of her stay. 5/12/2022 1932 by Frida Perea RN  Outcome: Progressing  Flowsheets (Taken 5/12/2022 1712 by Ryan Coles RN)  Discharge to home or other facility with appropriate resources: Identify barriers to discharge with patient and caregiver  Note: Pt aware of 2hr recovery period in L&D and then transfer to mom/baby for the remainder of her stay.       Problem: Pain  Goal: Verbalizes/displays adequate comfort level or baseline comfort level  5/13/2022 1016 by Martín Wick RN  Outcome: Progressing  Flowsheets (Taken 5/13/2022 2626)  Verbalizes/displays adequate comfort level or baseline comfort level:   Encourage patient to monitor pain and request assistance   Assess pain using appropriate pain scale   Implement non-pharmacological measures as appropriate and evaluate response   Administer analgesics based on type and severity of pain and evaluate response  Note: Patient has a pain goal of   \"6/10\". Planning on an epidural. Currently comfortable without pharmacological relief. Position changes and distraction techniques being utilized. 5/12/2022 1932 by Frida Perea RN  Outcome: Progressing  Flowsheets (Taken 5/12/2022 1712 by Ryan Coles RN)  Verbalizes/displays adequate comfort level or baseline comfort level:   Encourage patient to monitor pain and request assistance   Assess pain using appropriate pain scale  Note: Pt denies pain at this time. Pain goal is a 6/10. Pt plans for epidural in labor. Other options for pain management discussed with pt. Care plan reviewed with patient and FOB. Patient and FOB verbalize understanding of the plan of care and contribute to goal setting.

## 2022-05-13 NOTE — FLOWSHEET NOTE
Dr Eugenie Briggs returned page informed of VE 3/70/-2, pt rates pain a 5/10, but doesn't feel the contractions. Pitocin at 18 MU. Pt has sat up in the chair majority of the day. Is getting tearful from being exhausted. Orders received to stop pitocin for approximately 30 minutes then restart at half to see if can get contraction pattern adequate for labor.

## 2022-05-13 NOTE — FLOWSHEET NOTE
Dr. Lan Matthew returned call to unit. BP's reviewed. Cervical ying is out. SVE 3/60/-2. Pt is resting in bed with lights dimmed. FHT minimal at times but still has accels. Contractions noted every 4-5 min. Pitocin titrated per order. Cont. POC.

## 2022-05-13 NOTE — PROGRESS NOTES
19yo G1 @37w1d admitted for IOL due to worsening cHTN  S/p ying cervical ripening. Pt had a few severe range BPs through the night. A couple were while she was in pain right before ying came out. Received IV labetalol 20 and 40mg. BPs have improved significantly since pain controlled.   AROM clear @0430  SVE: 3/50/-2    EFM: 135/mod tessie/acels/no decels  TOCO: ctx not tracing well, IUPC placed at time of AROM    Continue pitocin  Anticipate

## 2022-05-13 NOTE — FLOWSHEET NOTE
Dr. Carmina Woods returns call to unit. BP's reviewed. Pt is in pain now, nauseous and is vomiting and moving around in the bed. Pt requesting stadol. Orders received.

## 2022-05-13 NOTE — FLOWSHEET NOTE
Dr. Manny Colbert paged to please call. Call returned. BP's reviewed. Lab has been called to make sure CMP and protein/creatinine ratio is being run. Orders received.

## 2022-05-14 VITALS — DIASTOLIC BLOOD PRESSURE: 94 MMHG | SYSTOLIC BLOOD PRESSURE: 163 MMHG | OXYGEN SATURATION: 100 %

## 2022-05-14 PROCEDURE — 6360000002 HC RX W HCPCS

## 2022-05-14 PROCEDURE — 6360000002 HC RX W HCPCS: Performed by: ANESTHESIOLOGY

## 2022-05-14 PROCEDURE — 96360 HYDRATION IV INFUSION INIT: CPT

## 2022-05-14 PROCEDURE — 90471 IMMUNIZATION ADMIN: CPT | Performed by: OBSTETRICS & GYNECOLOGY

## 2022-05-14 PROCEDURE — 6370000000 HC RX 637 (ALT 250 FOR IP): Performed by: OBSTETRICS & GYNECOLOGY

## 2022-05-14 PROCEDURE — 3700000000 HC ANESTHESIA ATTENDED CARE: Performed by: OBSTETRICS & GYNECOLOGY

## 2022-05-14 PROCEDURE — 7100000000 HC PACU RECOVERY - FIRST 15 MIN: Performed by: OBSTETRICS & GYNECOLOGY

## 2022-05-14 PROCEDURE — 2500000003 HC RX 250 WO HCPCS: Performed by: NURSE ANESTHETIST, CERTIFIED REGISTERED

## 2022-05-14 PROCEDURE — 2500000003 HC RX 250 WO HCPCS: Performed by: OBSTETRICS & GYNECOLOGY

## 2022-05-14 PROCEDURE — 3609079900 HC CESAREAN SECTION: Performed by: OBSTETRICS & GYNECOLOGY

## 2022-05-14 PROCEDURE — 6370000000 HC RX 637 (ALT 250 FOR IP): Performed by: STUDENT IN AN ORGANIZED HEALTH CARE EDUCATION/TRAINING PROGRAM

## 2022-05-14 PROCEDURE — 90715 TDAP VACCINE 7 YRS/> IM: CPT | Performed by: OBSTETRICS & GYNECOLOGY

## 2022-05-14 PROCEDURE — 6360000002 HC RX W HCPCS: Performed by: NURSE ANESTHETIST, CERTIFIED REGISTERED

## 2022-05-14 PROCEDURE — 3700000001 HC ADD 15 MINUTES (ANESTHESIA): Performed by: OBSTETRICS & GYNECOLOGY

## 2022-05-14 PROCEDURE — 6360000002 HC RX W HCPCS: Performed by: OBSTETRICS & GYNECOLOGY

## 2022-05-14 PROCEDURE — 88307 TISSUE EXAM BY PATHOLOGIST: CPT

## 2022-05-14 PROCEDURE — 1200000000 HC SEMI PRIVATE

## 2022-05-14 PROCEDURE — 2500000003 HC RX 250 WO HCPCS

## 2022-05-14 PROCEDURE — 7100000001 HC PACU RECOVERY - ADDTL 15 MIN: Performed by: OBSTETRICS & GYNECOLOGY

## 2022-05-14 PROCEDURE — 2709999900 HC NON-CHARGEABLE SUPPLY: Performed by: OBSTETRICS & GYNECOLOGY

## 2022-05-14 PROCEDURE — 2580000003 HC RX 258: Performed by: OBSTETRICS & GYNECOLOGY

## 2022-05-14 RX ORDER — MORPHINE SULFATE 4 MG/ML
2 INJECTION, SOLUTION INTRAMUSCULAR; INTRAVENOUS
Status: DISCONTINUED | OUTPATIENT
Start: 2022-05-15 | End: 2022-05-16 | Stop reason: HOSPADM

## 2022-05-14 RX ORDER — CARBOPROST TROMETHAMINE 250 UG/ML
250 INJECTION, SOLUTION INTRAMUSCULAR PRN
Status: DISCONTINUED | OUTPATIENT
Start: 2022-05-14 | End: 2022-05-16 | Stop reason: HOSPADM

## 2022-05-14 RX ORDER — ACETAMINOPHEN 500 MG
1000 TABLET ORAL EVERY 8 HOURS PRN
Status: DISCONTINUED | OUTPATIENT
Start: 2022-05-14 | End: 2022-05-16 | Stop reason: HOSPADM

## 2022-05-14 RX ORDER — SODIUM CHLORIDE 0.9 % (FLUSH) 0.9 %
10 SYRINGE (ML) INJECTION EVERY 12 HOURS SCHEDULED
Status: DISCONTINUED | OUTPATIENT
Start: 2022-05-14 | End: 2022-05-14 | Stop reason: HOSPADM

## 2022-05-14 RX ORDER — NALOXONE HYDROCHLORIDE 0.4 MG/ML
INJECTION, SOLUTION INTRAMUSCULAR; INTRAVENOUS; SUBCUTANEOUS PRN
Status: ACTIVE | OUTPATIENT
Start: 2022-05-14 | End: 2022-05-15

## 2022-05-14 RX ORDER — KETOROLAC TROMETHAMINE 30 MG/ML
30 INJECTION, SOLUTION INTRAMUSCULAR; INTRAVENOUS EVERY 6 HOURS
Status: DISCONTINUED | OUTPATIENT
Start: 2022-05-14 | End: 2022-05-14 | Stop reason: HOSPADM

## 2022-05-14 RX ORDER — OXYCODONE HYDROCHLORIDE AND ACETAMINOPHEN 5; 325 MG/1; MG/1
2 TABLET ORAL EVERY 4 HOURS PRN
Status: ACTIVE | OUTPATIENT
Start: 2022-05-14 | End: 2022-05-15

## 2022-05-14 RX ORDER — BISACODYL 10 MG
10 SUPPOSITORY, RECTAL RECTAL DAILY PRN
Status: DISCONTINUED | OUTPATIENT
Start: 2022-05-14 | End: 2022-05-16 | Stop reason: HOSPADM

## 2022-05-14 RX ORDER — LIDOCAINE HYDROCHLORIDE AND EPINEPHRINE 20; 5 MG/ML; UG/ML
INJECTION, SOLUTION EPIDURAL; INFILTRATION; INTRACAUDAL; PERINEURAL PRN
Status: DISCONTINUED | OUTPATIENT
Start: 2022-05-14 | End: 2022-05-14 | Stop reason: SDUPTHER

## 2022-05-14 RX ORDER — MORPHINE SULFATE 4 MG/ML
4 INJECTION, SOLUTION INTRAMUSCULAR; INTRAVENOUS
Status: DISCONTINUED | OUTPATIENT
Start: 2022-05-15 | End: 2022-05-16 | Stop reason: HOSPADM

## 2022-05-14 RX ORDER — ONDANSETRON 2 MG/ML
INJECTION INTRAMUSCULAR; INTRAVENOUS PRN
Status: DISCONTINUED | OUTPATIENT
Start: 2022-05-14 | End: 2022-05-14 | Stop reason: SDUPTHER

## 2022-05-14 RX ORDER — SODIUM CHLORIDE, SODIUM LACTATE, POTASSIUM CHLORIDE, CALCIUM CHLORIDE 600; 310; 30; 20 MG/100ML; MG/100ML; MG/100ML; MG/100ML
INJECTION, SOLUTION INTRAVENOUS CONTINUOUS
Status: DISCONTINUED | OUTPATIENT
Start: 2022-05-14 | End: 2022-05-14

## 2022-05-14 RX ORDER — EPHEDRINE SULFATE/0.9% NACL/PF 50 MG/5 ML
SYRINGE (ML) INTRAVENOUS PRN
Status: DISCONTINUED | OUTPATIENT
Start: 2022-05-14 | End: 2022-05-14 | Stop reason: SDUPTHER

## 2022-05-14 RX ORDER — ONDANSETRON 2 MG/ML
4 INJECTION INTRAMUSCULAR; INTRAVENOUS EVERY 6 HOURS PRN
Status: ACTIVE | OUTPATIENT
Start: 2022-05-14 | End: 2022-05-15

## 2022-05-14 RX ORDER — SIMETHICONE 80 MG
80 TABLET,CHEWABLE ORAL EVERY 6 HOURS PRN
Status: DISCONTINUED | OUTPATIENT
Start: 2022-05-14 | End: 2022-05-16 | Stop reason: HOSPADM

## 2022-05-14 RX ORDER — SODIUM CHLORIDE 0.9 % (FLUSH) 0.9 %
5-40 SYRINGE (ML) INJECTION EVERY 12 HOURS SCHEDULED
Status: DISCONTINUED | OUTPATIENT
Start: 2022-05-14 | End: 2022-05-16 | Stop reason: HOSPADM

## 2022-05-14 RX ORDER — OXYCODONE HYDROCHLORIDE 5 MG/1
10 TABLET ORAL EVERY 4 HOURS PRN
Status: DISCONTINUED | OUTPATIENT
Start: 2022-05-15 | End: 2022-05-16 | Stop reason: HOSPADM

## 2022-05-14 RX ORDER — FAMOTIDINE 10 MG/ML
20 INJECTION, SOLUTION INTRAVENOUS ONCE
Status: COMPLETED | OUTPATIENT
Start: 2022-05-14 | End: 2022-05-14

## 2022-05-14 RX ORDER — DIPHENHYDRAMINE HYDROCHLORIDE 50 MG/ML
25 INJECTION INTRAMUSCULAR; INTRAVENOUS EVERY 6 HOURS PRN
Status: DISCONTINUED | OUTPATIENT
Start: 2022-05-14 | End: 2022-05-16 | Stop reason: HOSPADM

## 2022-05-14 RX ORDER — MISOPROSTOL 200 UG/1
800 TABLET ORAL PRN
Status: DISCONTINUED | OUTPATIENT
Start: 2022-05-14 | End: 2022-05-16 | Stop reason: HOSPADM

## 2022-05-14 RX ORDER — SODIUM CHLORIDE 0.9 % (FLUSH) 0.9 %
10 SYRINGE (ML) INJECTION PRN
Status: CANCELLED | OUTPATIENT
Start: 2022-05-14

## 2022-05-14 RX ORDER — ENOXAPARIN SODIUM 100 MG/ML
40 INJECTION SUBCUTANEOUS DAILY
Status: DISCONTINUED | OUTPATIENT
Start: 2022-05-15 | End: 2022-05-16 | Stop reason: HOSPADM

## 2022-05-14 RX ORDER — ONDANSETRON 4 MG/1
8 TABLET, ORALLY DISINTEGRATING ORAL EVERY 8 HOURS PRN
Status: DISCONTINUED | OUTPATIENT
Start: 2022-05-15 | End: 2022-05-16 | Stop reason: HOSPADM

## 2022-05-14 RX ORDER — MORPHINE SULFATE 0.5 MG/ML
INJECTION, SOLUTION EPIDURAL; INTRATHECAL; INTRAVENOUS PRN
Status: DISCONTINUED | OUTPATIENT
Start: 2022-05-14 | End: 2022-05-14 | Stop reason: SDUPTHER

## 2022-05-14 RX ORDER — FERROUS SULFATE 325(65) MG
325 TABLET ORAL
Status: DISCONTINUED | OUTPATIENT
Start: 2022-05-14 | End: 2022-05-16 | Stop reason: HOSPADM

## 2022-05-14 RX ORDER — PRENATAL WITH FERROUS FUM AND FOLIC ACID 3080; 920; 120; 400; 22; 1.84; 3; 20; 10; 1; 12; 200; 27; 25; 2 [IU]/1; [IU]/1; MG/1; [IU]/1; MG/1; MG/1; MG/1; MG/1; MG/1; MG/1; UG/1; MG/1; MG/1; MG/1; MG/1
1 TABLET ORAL DAILY
Status: DISCONTINUED | OUTPATIENT
Start: 2022-05-14 | End: 2022-05-16 | Stop reason: HOSPADM

## 2022-05-14 RX ORDER — DEXTROSE MONOHYDRATE 50 MG/ML
INJECTION, SOLUTION INTRAVENOUS
Status: DISCONTINUED
Start: 2022-05-14 | End: 2022-05-14

## 2022-05-14 RX ORDER — METOCLOPRAMIDE HYDROCHLORIDE 5 MG/ML
10 INJECTION INTRAMUSCULAR; INTRAVENOUS ONCE
Status: COMPLETED | OUTPATIENT
Start: 2022-05-14 | End: 2022-05-14

## 2022-05-14 RX ORDER — KETOROLAC TROMETHAMINE 30 MG/ML
INJECTION, SOLUTION INTRAMUSCULAR; INTRAVENOUS PRN
Status: DISCONTINUED | OUTPATIENT
Start: 2022-05-14 | End: 2022-05-14 | Stop reason: SDUPTHER

## 2022-05-14 RX ORDER — OXYCODONE HYDROCHLORIDE 5 MG/1
5 TABLET ORAL EVERY 4 HOURS PRN
Status: DISCONTINUED | OUTPATIENT
Start: 2022-05-15 | End: 2022-05-16 | Stop reason: HOSPADM

## 2022-05-14 RX ORDER — PROCHLORPERAZINE EDISYLATE 5 MG/ML
INJECTION INTRAMUSCULAR; INTRAVENOUS PRN
Status: DISCONTINUED | OUTPATIENT
Start: 2022-05-14 | End: 2022-05-14 | Stop reason: SDUPTHER

## 2022-05-14 RX ORDER — TRISODIUM CITRATE DIHYDRATE AND CITRIC ACID MONOHYDRATE 500; 334 MG/5ML; MG/5ML
30 SOLUTION ORAL ONCE
Status: COMPLETED | OUTPATIENT
Start: 2022-05-14 | End: 2022-05-14

## 2022-05-14 RX ORDER — KETOROLAC TROMETHAMINE 30 MG/ML
30 INJECTION, SOLUTION INTRAMUSCULAR; INTRAVENOUS EVERY 6 HOURS
Status: DISPENSED | OUTPATIENT
Start: 2022-05-14 | End: 2022-05-15

## 2022-05-14 RX ORDER — SODIUM CHLORIDE 0.9 % (FLUSH) 0.9 %
5-40 SYRINGE (ML) INJECTION PRN
Status: DISCONTINUED | OUTPATIENT
Start: 2022-05-14 | End: 2022-05-16 | Stop reason: HOSPADM

## 2022-05-14 RX ORDER — CEFAZOLIN SODIUM 1 G/3ML
INJECTION, POWDER, FOR SOLUTION INTRAMUSCULAR; INTRAVENOUS PRN
Status: DISCONTINUED | OUTPATIENT
Start: 2022-05-14 | End: 2022-05-14 | Stop reason: SDUPTHER

## 2022-05-14 RX ORDER — SODIUM CHLORIDE 9 MG/ML
INJECTION, SOLUTION INTRAVENOUS PRN
Status: CANCELLED | OUTPATIENT
Start: 2022-05-14

## 2022-05-14 RX ORDER — DEXAMETHASONE SODIUM PHOSPHATE 10 MG/ML
INJECTION, EMULSION INTRAMUSCULAR; INTRAVENOUS PRN
Status: DISCONTINUED | OUTPATIENT
Start: 2022-05-14 | End: 2022-05-14 | Stop reason: SDUPTHER

## 2022-05-14 RX ORDER — MODIFIED LANOLIN
OINTMENT (GRAM) TOPICAL
Status: DISCONTINUED | OUTPATIENT
Start: 2022-05-14 | End: 2022-05-16 | Stop reason: HOSPADM

## 2022-05-14 RX ORDER — DOCUSATE SODIUM 100 MG/1
100 CAPSULE, LIQUID FILLED ORAL 2 TIMES DAILY
Status: DISCONTINUED | OUTPATIENT
Start: 2022-05-14 | End: 2022-05-16 | Stop reason: HOSPADM

## 2022-05-14 RX ORDER — METOCLOPRAMIDE HYDROCHLORIDE 5 MG/ML
INJECTION INTRAMUSCULAR; INTRAVENOUS
Status: COMPLETED
Start: 2022-05-14 | End: 2022-05-14

## 2022-05-14 RX ORDER — ONDANSETRON 2 MG/ML
4 INJECTION INTRAMUSCULAR; INTRAVENOUS EVERY 6 HOURS PRN
Status: DISCONTINUED | OUTPATIENT
Start: 2022-05-15 | End: 2022-05-16 | Stop reason: HOSPADM

## 2022-05-14 RX ORDER — SODIUM CHLORIDE 9 MG/ML
INJECTION, SOLUTION INTRAVENOUS PRN
Status: DISCONTINUED | OUTPATIENT
Start: 2022-05-14 | End: 2022-05-16 | Stop reason: HOSPADM

## 2022-05-14 RX ORDER — SODIUM CHLORIDE, SODIUM LACTATE, POTASSIUM CHLORIDE, CALCIUM CHLORIDE 600; 310; 30; 20 MG/100ML; MG/100ML; MG/100ML; MG/100ML
INJECTION, SOLUTION INTRAVENOUS CONTINUOUS
Status: DISCONTINUED | OUTPATIENT
Start: 2022-05-14 | End: 2022-05-16 | Stop reason: HOSPADM

## 2022-05-14 RX ORDER — AZITHROMYCIN 500 MG/1
INJECTION, POWDER, LYOPHILIZED, FOR SOLUTION INTRAVENOUS
Status: COMPLETED
Start: 2022-05-14 | End: 2022-05-14

## 2022-05-14 RX ADMIN — LIDOCAINE HYDROCHLORIDE,EPINEPHRINE BITARTRATE 10 ML: 20; .005 INJECTION, SOLUTION EPIDURAL; INFILTRATION; INTRACAUDAL; PERINEURAL at 01:15

## 2022-05-14 RX ADMIN — METOCLOPRAMIDE HYDROCHLORIDE 10 MG: 5 INJECTION INTRAMUSCULAR; INTRAVENOUS at 00:51

## 2022-05-14 RX ADMIN — SODIUM CHLORIDE, POTASSIUM CHLORIDE, SODIUM LACTATE AND CALCIUM CHLORIDE: 600; 310; 30; 20 INJECTION, SOLUTION INTRAVENOUS at 12:01

## 2022-05-14 RX ADMIN — PHENYLEPHRINE HYDROCHLORIDE 200 MCG: 10 INJECTION INTRAVENOUS at 01:44

## 2022-05-14 RX ADMIN — PHENYLEPHRINE HYDROCHLORIDE 200 MCG: 10 INJECTION INTRAVENOUS at 01:28

## 2022-05-14 RX ADMIN — FAMOTIDINE 20 MG: 10 INJECTION, SOLUTION INTRAVENOUS at 00:50

## 2022-05-14 RX ADMIN — DOCUSATE SODIUM 100 MG: 100 CAPSULE, LIQUID FILLED ORAL at 09:00

## 2022-05-14 RX ADMIN — PHENYLEPHRINE HYDROCHLORIDE 100 MCG: 10 INJECTION INTRAVENOUS at 01:55

## 2022-05-14 RX ADMIN — PHENYLEPHRINE HYDROCHLORIDE 200 MCG: 10 INJECTION INTRAVENOUS at 01:32

## 2022-05-14 RX ADMIN — PHENYLEPHRINE HYDROCHLORIDE 200 MCG: 10 INJECTION INTRAVENOUS at 01:41

## 2022-05-14 RX ADMIN — LIDOCAINE HYDROCHLORIDE,EPINEPHRINE BITARTRATE 5 ML: 20; .005 INJECTION, SOLUTION EPIDURAL; INFILTRATION; INTRACAUDAL; PERINEURAL at 01:12

## 2022-05-14 RX ADMIN — MORPHINE SULFATE 2 MG: 0.5 INJECTION, SOLUTION EPIDURAL; INTRATHECAL; INTRAVENOUS at 01:20

## 2022-05-14 RX ADMIN — PHENYLEPHRINE HYDROCHLORIDE 200 MCG: 10 INJECTION INTRAVENOUS at 01:25

## 2022-05-14 RX ADMIN — Medication 10 MG: at 01:55

## 2022-05-14 RX ADMIN — PRENATAL WITH FERROUS FUM AND FOLIC ACID 1 TABLET: 3080; 920; 120; 400; 22; 1.84; 3; 20; 10; 1; 12; 200; 27; 25; 2 TABLET ORAL at 09:00

## 2022-05-14 RX ADMIN — Medication 10 MG: at 01:50

## 2022-05-14 RX ADMIN — KETOROLAC TROMETHAMINE 30 MG: 30 INJECTION, SOLUTION INTRAMUSCULAR; INTRAVENOUS at 20:37

## 2022-05-14 RX ADMIN — DEXAMETHASONE SODIUM PHOSPHATE 10 MG: 10 INJECTION, EMULSION INTRAMUSCULAR; INTRAVENOUS at 01:20

## 2022-05-14 RX ADMIN — AZITHROMYCIN MONOHYDRATE: 500 INJECTION, POWDER, LYOPHILIZED, FOR SOLUTION INTRAVENOUS at 01:25

## 2022-05-14 RX ADMIN — PROCHLORPERAZINE EDISYLATE 5 MG: 5 INJECTION INTRAMUSCULAR; INTRAVENOUS at 01:55

## 2022-05-14 RX ADMIN — LABETALOL HYDROCHLORIDE 200 MG: 200 TABLET, FILM COATED ORAL at 22:10

## 2022-05-14 RX ADMIN — Medication 10 MG: at 02:00

## 2022-05-14 RX ADMIN — SODIUM CHLORIDE, POTASSIUM CHLORIDE, SODIUM LACTATE AND CALCIUM CHLORIDE: 600; 310; 30; 20 INJECTION, SOLUTION INTRAVENOUS at 03:57

## 2022-05-14 RX ADMIN — PHENYLEPHRINE HYDROCHLORIDE 200 MCG: 10 INJECTION INTRAVENOUS at 01:20

## 2022-05-14 RX ADMIN — PHENYLEPHRINE HYDROCHLORIDE 100 MCG: 10 INJECTION INTRAVENOUS at 01:50

## 2022-05-14 RX ADMIN — MORPHINE SULFATE 3 MG: 0.5 INJECTION, SOLUTION EPIDURAL; INTRATHECAL; INTRAVENOUS at 02:00

## 2022-05-14 RX ADMIN — Medication 87.3 MILLI-UNITS/MIN: at 02:43

## 2022-05-14 RX ADMIN — ACETAMINOPHEN 650 MG: 325 TABLET ORAL at 00:59

## 2022-05-14 RX ADMIN — DOCUSATE SODIUM 100 MG: 100 CAPSULE, LIQUID FILLED ORAL at 22:11

## 2022-05-14 RX ADMIN — PHENYLEPHRINE HYDROCHLORIDE 200 MCG: 10 INJECTION INTRAVENOUS at 01:22

## 2022-05-14 RX ADMIN — KETOROLAC TROMETHAMINE 30 MG: 30 INJECTION, SOLUTION INTRAMUSCULAR; INTRAVENOUS at 08:04

## 2022-05-14 RX ADMIN — ONDANSETRON 8 MG: 2 INJECTION INTRAMUSCULAR; INTRAVENOUS at 01:20

## 2022-05-14 RX ADMIN — METOCLOPRAMIDE 10 MG: 5 INJECTION, SOLUTION INTRAMUSCULAR; INTRAVENOUS at 00:51

## 2022-05-14 RX ADMIN — TETANUS TOXOID, REDUCED DIPHTHERIA TOXOID AND ACELLULAR PERTUSSIS VACCINE, ADSORBED 0.5 ML: 5; 2.5; 8; 8; 2.5 SUSPENSION INTRAMUSCULAR at 15:17

## 2022-05-14 RX ADMIN — LABETALOL HYDROCHLORIDE 200 MG: 200 TABLET, FILM COATED ORAL at 09:00

## 2022-05-14 RX ADMIN — CEFAZOLIN 2000 MG: 1 INJECTION, POWDER, FOR SOLUTION INTRAMUSCULAR; INTRAVENOUS at 01:15

## 2022-05-14 RX ADMIN — PHENYLEPHRINE HYDROCHLORIDE 200 MCG: 10 INJECTION INTRAVENOUS at 01:46

## 2022-05-14 RX ADMIN — KETOROLAC TROMETHAMINE 30 MG: 30 INJECTION, SOLUTION INTRAMUSCULAR; INTRAVENOUS at 14:59

## 2022-05-14 RX ADMIN — Medication 10 MG: at 01:53

## 2022-05-14 RX ADMIN — Medication 10 MG: at 01:45

## 2022-05-14 RX ADMIN — PHENYLEPHRINE HYDROCHLORIDE 200 MCG: 10 INJECTION INTRAVENOUS at 01:38

## 2022-05-14 RX ADMIN — SODIUM CITRATE AND CITRIC ACID MONOHYDRATE 30 ML: 500; 334 SOLUTION ORAL at 00:50

## 2022-05-14 RX ADMIN — KETOROLAC TROMETHAMINE 15 MG: 30 INJECTION, SOLUTION INTRAMUSCULAR; INTRAVENOUS at 01:20

## 2022-05-14 RX ADMIN — LIDOCAINE HYDROCHLORIDE,EPINEPHRINE BITARTRATE 5 ML: 20; .005 INJECTION, SOLUTION EPIDURAL; INFILTRATION; INTRACAUDAL; PERINEURAL at 01:17

## 2022-05-14 ASSESSMENT — PULMONARY FUNCTION TESTS
PIF_VALUE: 1

## 2022-05-14 ASSESSMENT — PAIN - FUNCTIONAL ASSESSMENT
PAIN_FUNCTIONAL_ASSESSMENT: ACTIVITIES ARE NOT PREVENTED
PAIN_FUNCTIONAL_ASSESSMENT: ACTIVITIES ARE NOT PREVENTED

## 2022-05-14 ASSESSMENT — PAIN SCALES - GENERAL
PAINLEVEL_OUTOF10: 1
PAINLEVEL_OUTOF10: 2
PAINLEVEL_OUTOF10: 1
PAINLEVEL_OUTOF10: 0
PAINLEVEL_OUTOF10: 2
PAINLEVEL_OUTOF10: 0

## 2022-05-14 ASSESSMENT — PAIN DESCRIPTION - ORIENTATION: ORIENTATION: LOWER

## 2022-05-14 ASSESSMENT — PAIN DESCRIPTION - LOCATION
LOCATION: ABDOMEN;INCISION
LOCATION: ABDOMEN;INCISION

## 2022-05-14 ASSESSMENT — PAIN DESCRIPTION - DESCRIPTORS
DESCRIPTORS: DISCOMFORT
DESCRIPTORS: DISCOMFORT

## 2022-05-14 NOTE — FLOWSHEET NOTE
Dr. Kaleb Arita phones unit. Informed physician that SVE is unchanged, FHR reactive, fredrick every 2-3 minutes. Pitocin titrated to 14. Orders received to re-check cervix at 2200 and update physician at that time, continue with plan of care.

## 2022-05-14 NOTE — PLAN OF CARE
Problem: Gastrointestinal - Adult  Goal: Minimal or absence of nausea and vomiting  2022 1544 by Fran Sanchez RN  Outcome: Progressing  Flowsheets (Taken 2022)  Minimal or absence of nausea and vomiting: Administer IV fluids as ordered to ensure adequate hydration     Problem: Genitourinary - Adult  Goal: Urinary catheter remains patent  2022 154 by Fran Sanchez RN  Outcome: Progressing  Flowsheets (Taken 2022)  Urinary catheter remains patent: Assess patency of urinary catheter     Problem: Skin/Tissue Integrity - Adult  Goal: Skin integrity remains intact  2022 by Fran Sanchez RN  Outcome: Progressing  Flowsheets (Taken 2022)  Skin Integrity Remains Intact: Monitor for areas of redness and/or skin breakdown     Problem: Postpartum  Goal: Appropriate maternal -  bonding  Description:  Postpartum OB-Pregnancy care plan goal which identifies if the mother and  are bonding appropriately  Outcome: Progressing  Note: Bonding well     Problem: Pain  Goal: Verbalizes/displays adequate comfort level or baseline comfort level  Outcome: Progressing  Flowsheets (Taken 2022)  Verbalizes/displays adequate comfort level or baseline comfort level:   Encourage patient to monitor pain and request assistance   Assess pain using appropriate pain scale   Care plan reviewed with patient. Patient verbalizes understanding of the plan of care and contribute to goal setting.

## 2022-05-14 NOTE — PLAN OF CARE
Problem: Gastrointestinal - Adult  Goal: Minimal or absence of nausea and vomiting  Outcome: Progressing  Flowsheets (Taken 5/14/2022 0554)  Minimal or absence of nausea and vomiting:   Administer IV fluids as ordered to ensure adequate hydration   Provide nonpharmacologic comfort measures as appropriate   Advance diet as tolerated, if ordered     Problem: Genitourinary - Adult  Goal: Urinary catheter remains patent  Outcome: Progressing  Flowsheets (Taken 5/14/2022 0554)  Urinary catheter remains patent: Assess patency of urinary catheter     Problem: Skin/Tissue Integrity - Adult  Goal: Skin integrity remains intact  Outcome: Progressing  Flowsheets (Taken 5/14/2022 0554)  Skin Integrity Remains Intact: Monitor for areas of redness and/or skin breakdown   Care plan reviewed with patient and she contributes to goal setting and voices understanding of plan of care.

## 2022-05-14 NOTE — PROGRESS NOTES
S: comfortable with epidural    O:  Vitals:    22 0030   BP: (!) 153/91   Pulse: 89   Resp: 16   Temp: 97.9 °F (36.6 °C)   SpO2: 100%     Gen: tearful but appropriate  Sve: 4 cm per nursing-unchanged     FHTs: 145, mod tessie, +accels, no decels  [place]: q2-3 min. Category I tracing. Reactive. A: 23 yo  @ 37+2 wks IOL for worsening CHTN  P:  1. No cervical change since  with adequate contractions. Recommended PCS for arrest of dilation. I discussed the risks of the procedure including but not limited to bleeding, infection, injury to bowel/bladder. Discussed benefits and alternatives as well. All questions answered. Family at bedside. She is in agreement to proceed.      Marlene Calderon MD  12:55 AM  2022

## 2022-05-14 NOTE — FLOWSHEET NOTE
Patient transferred to mother/baby unit via bed holding infant, both in stable condition. Family at bedside with documented belongings.  Report given to [employer]

## 2022-05-14 NOTE — FLOWSHEET NOTE
Surgery, [facility], JESSICA, ANIKA Sánchez CRNA called about . Adeola Louis will let Dr. Tito Currie know since he is in house.

## 2022-05-14 NOTE — FLOWSHEET NOTE
Discharge instructions given and reviewed with patient. Informed patient to notify physican or come back to L & D if leaking fluid from vagina with or without contractions. Bright red vaginal bleeding occurs that is as heavy as or heavier than a period. Regular contractions that are 2-5 minutes apart that are longer, stronger, and closer together. Decreased fetal movement. Elevated temperature >100.5°F and chills. Blurred vision, spots before eyes, unrelievable headache, severe facial swelling, or upper abdominal pain. Questions and concerns  Denied by pt and her ar Birch, papers signed.

## 2022-05-14 NOTE — L&D DELIVERY NOTE
Ob Service    Operative Report  [address] [facility]      Pt Name: Jodi Drummond  MRN: [id number] [place] #: [de-identified]  YOB: 2000  Procedure Performed By: Bharat Treviño MD, MD        Pre-operative Diagnosis:  37w2d pregnancy, arrest of dilation, IOL for worsening chronic hypertension    Post-operative Diagnosis:  37w2d pregnancy, arrest of dilation, IOL for worsening chronic hypertension    Procedure:  Repeat lower transverse     Surgeon:  Bharat Treviño MD    Anesthesia:  epidural    Estimated blood loss:   900 cc    Fluids: 1000 cc LR    Urine: 100 cc, clear at the end of the procedure    Findings:  Normal uterus, tubes and ovaries. Infant Wt: 6#5  Information for the patient's :  Amada Lundberg [[id number]]             APGARS:  8 @ 1 min; 9 @ 5 min   Information for the patient's :  Amada Lundberg [[id number]]          Complications:  NONE    Pathology:  none    Indications: The patient is a 24 y.o.  female at 42w2d  who presented with IOL for worsening chronic hypertension. She underwent ying bulb cervical ripening, AROM and pitocin induction. She progressed to 4 cm and remained there without cervical change for >6 hours. The decision was made to proceed with a primary . All risks, benefits and alternatives to the procedure were discussed in detail including but not limited to bleeding, infection, and injury to other abdominal organs. All questions were answered and the consent was signed. Procedure: The patient was taken to operating room where epidural anesthesia was initiated. She was identified as  Jodi Drummond and the procedure verified as  Delivery. A Time Out was held and the above information confirmed. She was placed in dorsal supine position with a left dao tilt and  was draped and prepped in the usual sterile manner.  Anesthesia was found to be adequate and a Pfannenstiel incision was made and carried down through the subcutaneous tissue to the fascia. The fascia was incised in the midline and the incision was extended laterally with the Dodge scissors. The anterior lip of the fascia was elevated with Kocher clamps and the rectus muscles were dissected off sharply and bluntly. Attention was turned to the inferior aspect of the fascia and in a similar fashion, it was elevated with Kocher clamps and the rectus muscles were dissected off sharply and bluntly. The rectus muscles were seperated in the midline and the peritoneum was identified, elevated with the hemostats and entered sharply with the Metzenbaum scissors. The incision was extended superiorly and inferiorly. The bladder blade was inserted. The vesicouterine peritoneum was identified and entered sharply with the Metzenbaum scissors, the incision was extended laterally, the bladder flap was created digitally. The bladder blade was reinserted. The uterine incision was made in a transverse fashion and extended manually. The bag of water was ruptured and the infants head was delivered atraumatically. The nose and mouth were suctioned with a suction bulb. The cord was clamped and cut after about 1 minute and the the crying infant was handed off to the waiting nurses. The placenta was expressed. The uterus was cleared of all clots and debris. The uterus was exteriorized. The uterine incision was grasped with Allis clamps. The uterine incision was closed with 0-vicryl in a running locked fashion. A second layer of the same suture was used as well. Several figure of 8s of 0 vicryl were placed along the incision. Good hemostasis was noted. The uterus was placed back in side the abdomen. Good hemostasis was noted. The peritoneum was grasped with hemostats and closed with 2-0 vicryl in a running fashion. The muscle layer was inspected for hemostasis. The electrocautery was  used for hemostasis. Good hemostasis was noted.  The fascia was closed with 0-vicryl in a running fashion. The subcutaneous layer was irrigated. Hemostasis was achieved with the electrocautery. The subcutaneous layer was closed with 2-0 plain in running fashion. The skin was closed with 4-0 vicryl in a subcuticular fashion. Dressing applied. The uterus was expressed. Lochia appropriate. Instrument, sponge, and needle counts were correct prior the abdominal closure and at the conclusion of the case. The patient tolerated the procedure well and was taken back to the recovery room in stable condition.        Michael Ken MD  2:34 AM  5/14/2022

## 2022-05-14 NOTE — FLOWSHEET NOTE
Abdomen prepped with CHG wipes. Vagina prepped with betadine swabs. SCDS placed on bilateral lower legs.

## 2022-05-14 NOTE — FLOWSHEET NOTE
Harini care completed and new pads on with underwear. Patient sat on side of bed and walked in room and tolerated well. Patient back to bed. Instructed to not get up without help, she verbalized understanding.

## 2022-05-14 NOTE — FLOWSHEET NOTE
Dr. Art Sor phones unit. Informed of SVE, no cervical change noted. Pit on 18, contractions becoming spaced out and irregular. Different positions attempted.  Orders received to attempt repositioning into knee-chest.

## 2022-05-14 NOTE — FLOWSHEET NOTE
Patient cleansed with warm wipes and warm wash clothes. Adia care performed, clean adia pads and chux pads placed. Abdominal binder applied, clean gown given. Patient tolerated well.

## 2022-05-15 LAB — HEMOGLOBIN: 9 GM/DL (ref 12–16)

## 2022-05-15 PROCEDURE — 6360000002 HC RX W HCPCS: Performed by: OBSTETRICS & GYNECOLOGY

## 2022-05-15 PROCEDURE — 1200000000 HC SEMI PRIVATE

## 2022-05-15 PROCEDURE — 6370000000 HC RX 637 (ALT 250 FOR IP): Performed by: OBSTETRICS & GYNECOLOGY

## 2022-05-15 PROCEDURE — 36415 COLL VENOUS BLD VENIPUNCTURE: CPT

## 2022-05-15 PROCEDURE — 85018 HEMOGLOBIN: CPT

## 2022-05-15 RX ADMIN — OXYCODONE HYDROCHLORIDE 10 MG: 5 TABLET ORAL at 22:51

## 2022-05-15 RX ADMIN — OXYCODONE HYDROCHLORIDE 5 MG: 5 TABLET ORAL at 14:45

## 2022-05-15 RX ADMIN — IBUPROFEN 800 MG: 800 TABLET, FILM COATED ORAL at 20:02

## 2022-05-15 RX ADMIN — ENOXAPARIN SODIUM 40 MG: 100 INJECTION SUBCUTANEOUS at 08:35

## 2022-05-15 RX ADMIN — OXYCODONE HYDROCHLORIDE 5 MG: 5 TABLET ORAL at 08:35

## 2022-05-15 RX ADMIN — ACETAMINOPHEN 1000 MG: 500 TABLET ORAL at 16:40

## 2022-05-15 RX ADMIN — ACETAMINOPHEN 1000 MG: 500 TABLET ORAL at 08:34

## 2022-05-15 RX ADMIN — DOCUSATE SODIUM 100 MG: 100 CAPSULE, LIQUID FILLED ORAL at 08:34

## 2022-05-15 RX ADMIN — OXYCODONE HYDROCHLORIDE 5 MG: 5 TABLET ORAL at 18:48

## 2022-05-15 RX ADMIN — LABETALOL HYDROCHLORIDE 200 MG: 200 TABLET, FILM COATED ORAL at 08:36

## 2022-05-15 RX ADMIN — IBUPROFEN 800 MG: 800 TABLET, FILM COATED ORAL at 11:48

## 2022-05-15 RX ADMIN — PRENATAL WITH FERROUS FUM AND FOLIC ACID 1 TABLET: 3080; 920; 120; 400; 22; 1.84; 3; 20; 10; 1; 12; 200; 27; 25; 2 TABLET ORAL at 08:34

## 2022-05-15 RX ADMIN — LABETALOL HYDROCHLORIDE 200 MG: 200 TABLET, FILM COATED ORAL at 20:02

## 2022-05-15 RX ADMIN — DOCUSATE SODIUM 100 MG: 100 CAPSULE, LIQUID FILLED ORAL at 20:02

## 2022-05-15 RX ADMIN — IBUPROFEN 800 MG: 800 TABLET, FILM COATED ORAL at 03:42

## 2022-05-15 RX ADMIN — OXYCODONE HYDROCHLORIDE 10 MG: 5 TABLET ORAL at 03:40

## 2022-05-15 ASSESSMENT — PAIN DESCRIPTION - DESCRIPTORS
DESCRIPTORS: ACHING;BURNING
DESCRIPTORS: BURNING
DESCRIPTORS: DISCOMFORT;BURNING
DESCRIPTORS: DISCOMFORT

## 2022-05-15 ASSESSMENT — PAIN - FUNCTIONAL ASSESSMENT
PAIN_FUNCTIONAL_ASSESSMENT: ACTIVITIES ARE NOT PREVENTED

## 2022-05-15 ASSESSMENT — PAIN DESCRIPTION - LOCATION
LOCATION: ABDOMEN;INCISION
LOCATION: ABDOMEN

## 2022-05-15 ASSESSMENT — PAIN DESCRIPTION - ORIENTATION
ORIENTATION: LOWER
ORIENTATION: LOWER

## 2022-05-15 ASSESSMENT — PAIN SCALES - GENERAL
PAINLEVEL_OUTOF10: 5
PAINLEVEL_OUTOF10: 1
PAINLEVEL_OUTOF10: 2
PAINLEVEL_OUTOF10: 2
PAINLEVEL_OUTOF10: 4
PAINLEVEL_OUTOF10: 8
PAINLEVEL_OUTOF10: 1
PAINLEVEL_OUTOF10: 2

## 2022-05-15 NOTE — FLOWSHEET NOTE
Patient up to void large amount, bleeding scant, firm U-1. Tolerated well back to bed call light within reach.

## 2022-05-15 NOTE — ANESTHESIA POSTPROCEDURE EVALUATION
Department of Anesthesiology  Postprocedure Note    Patient: Benjamin Middleton  MRN: [id number]  YOB: 2000  Date of evaluation: 5/15/2022  Time:  12:47 PM     Procedure Summary     Date: 22 Room / Location: [address] [place] [address]    Anesthesia Start: 8079 Anesthesia Stop: 22 0229    Procedures:        SECTION (N/A Uterus)      Labor Analgesia Diagnosis: (Failure to Progress)    Surgeons: Michael Ken MD Responsible Provider: Dave Rodriguez DO    Anesthesia Type: epidural ASA Status: 2          Anesthesia Type: No value filed. Latrice Phase I: Latrice Score: 9    Latrice Phase II: Latrice Score: 9    Last vitals: Reviewed and per EMR flowsheets.        Anesthesia Post Evaluation    Patient location during evaluation: floor  Patient participation: complete - patient participated  Level of consciousness: awake and alert  Airway patency: patent  Nausea & Vomiting: no nausea and no vomiting  Complications: no  Cardiovascular status: hemodynamically stable  Respiratory status: acceptable and spontaneous ventilation  Hydration status: euvolemic

## 2022-05-15 NOTE — PLAN OF CARE
Problem: Infection - Adult  Goal: Absence of infection during hospitalization  Recent Flowsheet Documentation  Taken 5/15/2022 0836 by Kary Lyon RN  Absence of infection during hospitalization:   Assess and monitor for signs and symptoms of infection   Monitor lab/diagnostic results     Problem: Safety - Adult  Goal: Free from fall injury  Recent Flowsheet Documentation  Taken 5/15/2022 0836 by Kary Lyon RN  Free From Fall Injury: Instruct family/caregiver on patient safety     Problem: Discharge Planning  Goal: Discharge to home or other facility with appropriate resources  Recent Flowsheet Documentation  Taken 5/15/2022 0836 by Kary Lyon RN  Discharge to home or other facility with appropriate resources: Identify barriers to discharge with patient and caregiver     Problem: Postpartum  Goal: Appropriate maternal -  bonding  Description:  Postpartum OB-Pregnancy care plan goal which identifies if the mother and  are bonding appropriately  Outcome: Progressing  Note: Patient bonding well with infant     Problem: Pain  Goal: Verbalizes/displays adequate comfort level or baseline comfort level  Outcome: Progressing  Flowsheets (Taken 5/15/2022 0836)  Verbalizes/displays adequate comfort level or baseline comfort level:   Encourage patient to monitor pain and request assistance   Assess pain using appropriate pain scale     Problem: Pain  Goal: Verbalizes/displays adequate comfort level or baseline comfort level  Recent Flowsheet Documentation  Taken 5/15/2022 0836 by Kary Lyon RN  Verbalizes/displays adequate comfort level or baseline comfort level:   Encourage patient to monitor pain and request assistance   Assess pain using appropriate pain scale     Problem: Gastrointestinal - Adult  Goal: Minimal or absence of nausea and vomiting  Outcome: Progressing  Flowsheets (Taken 5/15/2022 0836)  Minimal or absence of nausea and vomiting: Advance diet as tolerated, if ordered Problem: Genitourinary - Adult  Goal: Urinary catheter remains patent  Outcome: Progressing  Note: Patient voiding well     Problem: Skin/Tissue Integrity - Adult  Goal: Skin integrity remains intact  Outcome: Progressing  Flowsheets (Taken 5/15/2022 3336)  Skin Integrity Remains Intact: Monitor for areas of redness and/or skin breakdown   Care plan reviewed with patient and SO. Patient and SO verbalize understanding of the plan of care and contribute to goal setting.

## 2022-05-15 NOTE — PROGRESS NOTES
Department of Obstetrics and Gynecology  Progress Note      S: Doing well. No complaints. Pain controlled. Lochia appropriate. +flatus. marely reg diet. +Voiding. Blanco removed. + ambulation. Denies cp, sob, ct. Denies headache, vision changes    O:   Vitals:    05/15/22 0836   BP: 128/75   Pulse: 75   Resp:    Temp:    SpO2:        Gen: no acute distress   Resp: breathing unlabored   Abd: soft, nondistended, fundus firm below umbilicus, incision covered-dressing with some amount of dark blood on the antior portion     Lab Results   Component Value Date    WBC 11.0 (H) 2022    HGB 9.0 (L) 05/15/2022    HCT 33.3 (L) 2022    MCV 79.7 (L) 2022     2022       A: 24 y.o.   POD#1 s/p PCS for arrest of dilation, failed IOL for worsening CHTN, doing well. P:   1. O POS  2. Encourage ambulation and IS use   3. Con't postop care   4. con't labetalol 200 mg BID-BP well controlled 120-130s/60-80s  5.  Anticipate d/c home POD#2 or #3    Oscar Lugo MD  8:39 AM  5/15/2022

## 2022-05-15 NOTE — PLAN OF CARE
Problem: Gastrointestinal - Adult  Goal: Minimal or absence of nausea and vomiting  2022 by Lucy Vanegas RN  Outcome: Progressing  Flowsheets (Taken 2022 08 by Bety Grady RN)  Minimal or absence of nausea and vomiting: Administer IV fluids as ordered to ensure adequate hydration     Problem: Genitourinary - Adult  Goal: Urinary catheter remains patent  2022 by Lucy Vanegas RN  Outcome: Progressing  Flowsheets (Taken 2022 0804 by Bety Grady RN)  Urinary catheter remains patent: Assess patency of urinary catheter     Problem: Skin/Tissue Integrity - Adult  Goal: Skin integrity remains intact  2022 by Lucy Vanegas RN  Outcome: Progressing  Flowsheets (Taken 2022 08 by Bety Grady RN)  Skin Integrity Remains Intact: Monitor for areas of redness and/or skin breakdown     Problem: Postpartum  Goal: Appropriate maternal -  bonding  Description:  Postpartum OB-Pregnancy care plan goal which identifies if the mother and  are bonding appropriately  2022 by Lucy Vanegas RN  Outcome: Progressing  Note: Bonding with baby, participating in infant care. Problem: Pain  Goal: Verbalizes/displays adequate comfort level or baseline comfort level  2022 by Lucy Vanegas RN  Outcome: Progressing  Flowsheets (Taken 2022 0804 by Bety Grady RN)  Verbalizes/displays adequate comfort level or baseline comfort level:   Encourage patient to monitor pain and request assistance   Assess pain using appropriate pain scale     Care plan reviewed with patient. Patient verbalizes understanding of the plan of care and contribute to goal setting.

## 2022-05-15 NOTE — FLOWSHEET NOTE
Patient up to void large amount, bleeding scant. Firm U-U . Tolerated well back to bed call light within reach.

## 2022-05-16 VITALS
HEIGHT: 65 IN | TEMPERATURE: 98.4 F | SYSTOLIC BLOOD PRESSURE: 138 MMHG | BODY MASS INDEX: 38.32 KG/M2 | RESPIRATION RATE: 18 BRPM | DIASTOLIC BLOOD PRESSURE: 73 MMHG | HEART RATE: 75 BPM | OXYGEN SATURATION: 98 % | WEIGHT: 230 LBS

## 2022-05-16 PROBLEM — Z98.891 S/P C-SECTION: Status: ACTIVE | Noted: 2022-05-16

## 2022-05-16 LAB
BASOPHILS # BLD: 0.5 %
BASOPHILS ABSOLUTE: 0.1 THOU/MM3 (ref 0–0.1)
EOSINOPHIL # BLD: 2.3 %
EOSINOPHILS ABSOLUTE: 0.2 THOU/MM3 (ref 0–0.4)
ERYTHROCYTE [DISTWIDTH] IN BLOOD BY AUTOMATED COUNT: 14 % (ref 11.5–14.5)
ERYTHROCYTE [DISTWIDTH] IN BLOOD BY AUTOMATED COUNT: 40.7 FL (ref 35–45)
HCT VFR BLD CALC: 27.8 % (ref 37–47)
HEMOGLOBIN: 8.7 GM/DL (ref 12–16)
IMMATURE GRANS (ABS): 0.05 THOU/MM3 (ref 0–0.07)
IMMATURE GRANULOCYTES: 0.5 %
LYMPHOCYTES # BLD: 16.9 %
LYMPHOCYTES ABSOLUTE: 1.7 THOU/MM3 (ref 1–4.8)
MCH RBC QN AUTO: 25.5 PG (ref 26–33)
MCHC RBC AUTO-ENTMCNC: 31.3 GM/DL (ref 32.2–35.5)
MCV RBC AUTO: 81.5 FL (ref 81–99)
MONOCYTES # BLD: 7.3 %
MONOCYTES ABSOLUTE: 0.7 THOU/MM3 (ref 0.4–1.3)
NUCLEATED RED BLOOD CELLS: 0 /100 WBC
PLATELET # BLD: 250 THOU/MM3 (ref 130–400)
PMV BLD AUTO: 10.3 FL (ref 9.4–12.4)
RBC # BLD: 3.41 MILL/MM3 (ref 4.2–5.4)
SEG NEUTROPHILS: 72.5 %
SEGMENTED NEUTROPHILS ABSOLUTE COUNT: 7.3 THOU/MM3 (ref 1.8–7.7)
WBC # BLD: 10.1 THOU/MM3 (ref 4.8–10.8)

## 2022-05-16 PROCEDURE — 6370000000 HC RX 637 (ALT 250 FOR IP): Performed by: OBSTETRICS & GYNECOLOGY

## 2022-05-16 PROCEDURE — 36415 COLL VENOUS BLD VENIPUNCTURE: CPT

## 2022-05-16 PROCEDURE — 6360000002 HC RX W HCPCS: Performed by: OBSTETRICS & GYNECOLOGY

## 2022-05-16 PROCEDURE — 85025 COMPLETE CBC W/AUTO DIFF WBC: CPT

## 2022-05-16 RX ORDER — OXYCODONE HYDROCHLORIDE AND ACETAMINOPHEN 5; 325 MG/1; MG/1
1 TABLET ORAL EVERY 6 HOURS PRN
Qty: 28 TABLET | Refills: 0 | Status: SHIPPED | OUTPATIENT
Start: 2022-05-16 | End: 2022-05-23

## 2022-05-16 RX ORDER — IBUPROFEN 800 MG/1
800 TABLET ORAL EVERY 8 HOURS PRN
Qty: 40 TABLET | Refills: 0 | Status: SHIPPED | OUTPATIENT
Start: 2022-05-16

## 2022-05-16 RX ORDER — LABETALOL 200 MG/1
200 TABLET, FILM COATED ORAL EVERY 12 HOURS SCHEDULED
Qty: 60 TABLET | Refills: 2 | Status: ON HOLD | OUTPATIENT
Start: 2022-05-16 | End: 2022-05-21 | Stop reason: HOSPADM

## 2022-05-16 RX ADMIN — ACETAMINOPHEN 1000 MG: 500 TABLET ORAL at 08:57

## 2022-05-16 RX ADMIN — OXYCODONE HYDROCHLORIDE 10 MG: 5 TABLET ORAL at 08:58

## 2022-05-16 RX ADMIN — PRENATAL WITH FERROUS FUM AND FOLIC ACID 1 TABLET: 3080; 920; 120; 400; 22; 1.84; 3; 20; 10; 1; 12; 200; 27; 25; 2 TABLET ORAL at 08:55

## 2022-05-16 RX ADMIN — OXYCODONE HYDROCHLORIDE 5 MG: 5 TABLET ORAL at 03:51

## 2022-05-16 RX ADMIN — FERROUS SULFATE TAB 325 MG (65 MG ELEMENTAL FE) 325 MG: 325 (65 FE) TAB at 09:17

## 2022-05-16 RX ADMIN — DOCUSATE SODIUM 100 MG: 100 CAPSULE, LIQUID FILLED ORAL at 08:56

## 2022-05-16 RX ADMIN — IBUPROFEN 800 MG: 800 TABLET, FILM COATED ORAL at 03:51

## 2022-05-16 RX ADMIN — ACETAMINOPHEN 1000 MG: 500 TABLET ORAL at 00:36

## 2022-05-16 RX ADMIN — ENOXAPARIN SODIUM 40 MG: 100 INJECTION SUBCUTANEOUS at 09:01

## 2022-05-16 RX ADMIN — LABETALOL HYDROCHLORIDE 200 MG: 200 TABLET, FILM COATED ORAL at 08:56

## 2022-05-16 ASSESSMENT — PAIN - FUNCTIONAL ASSESSMENT: PAIN_FUNCTIONAL_ASSESSMENT: ACTIVITIES ARE NOT PREVENTED

## 2022-05-16 ASSESSMENT — PAIN DESCRIPTION - ORIENTATION: ORIENTATION: LEFT

## 2022-05-16 ASSESSMENT — PAIN SCALES - GENERAL
PAINLEVEL_OUTOF10: 1
PAINLEVEL_OUTOF10: 2
PAINLEVEL_OUTOF10: 3

## 2022-05-16 ASSESSMENT — PAIN DESCRIPTION - DESCRIPTORS: DESCRIPTORS: BURNING;DISCOMFORT

## 2022-05-16 ASSESSMENT — PAIN DESCRIPTION - LOCATION: LOCATION: INCISION;ABDOMEN

## 2022-05-16 NOTE — PROGRESS NOTES
Progress note    Subjective:     Postoperative Day 2:  Delivery    Pain is well controlled with current medications. The patient is ambulating well. The patient is tolerating a normal diet. Voiding spontaneously. Passing flatus. BPs well controlled. Objective:     Vitals:    22 0351   BP: 132/81   Pulse: 80   Resp: 16   Temp: 97.9 °F (36.6 °C)   SpO2:          General:    alert, appears stated age and cooperative   Uterine Fundus:   firm   Incision:  healing well, no significant drainage, no significant erythema        CBC   Lab Results   Component Value Date    WBC 10.1 2022    HGB 8.7 (L) 2022    HCT 27.8 (L) 2022     2022        Assessment:     Status post  section. Doing well postoperatively.       Plan:     Continue labetalol for BP  Discharge home with standard precautions and return to clinic in 1 weeks    [address] [place] [address] 2022 8:28 AM

## 2022-05-16 NOTE — FLOWSHEET NOTE
Post birth warning signs education paper given and reviewed, teaching complete. [place] postpartum depression screening discussed with patient, instructed to contact her healthcare provider if her score is > 10. Patient voiced understanding. Mother's blood type is O+. Baby's blood type is A -.   Mother did not receive Rhogam.

## 2022-05-16 NOTE — PLAN OF CARE
Problem: Gastrointestinal - Adult  Goal: Minimal or absence of nausea and vomiting  5/15/2022 2303 by Johana Meyer RN  Outcome: Progressing  Flowsheets  Taken 5/15/2022 2303  Minimal or absence of nausea and vomiting: Advance diet as tolerated, if ordered  Taken 5/15/2022 2002  Minimal or absence of nausea and vomiting: Advance diet as tolerated, if ordered     Problem: Skin/Tissue Integrity - Adult  Goal: Skin integrity remains intact  5/15/2022 2303 by Johana Meyer RN  Outcome: Progressing  Flowsheets (Taken 5/15/2022 2002)  Skin Integrity Remains Intact: Monitor for areas of redness and/or skin breakdown     Problem: Postpartum  Goal: Appropriate maternal -  bonding  Description:  Postpartum OB-Pregnancy care plan goal which identifies if the mother and  are bonding appropriately  5/15/2022 2303 by Johana Meyer RN  Outcome: Progressing  Note: Mother and father bonding well with infant     Plan of care discussed with mother and she contributes to goal setting and voices understanding of plan of care.

## 2022-05-16 NOTE — DISCHARGE SUMMARY
C/Section Discharge Summary    Admitting diagnosis: IUP    Gestational Age:37w2d    Antepartum complications: Worsening chronic HTN    Date of Admission: 2022  5:00 PM      Type of Delivery:  section - primary    Labs: CBC   Lab Results   Component Value Date    WBC 10.1 2022    HGB 8.7 (L) 2022    HCT 27.8 (L) 2022     2022        Intrapartum complications: Failure to Progress    Postpartum complications: none    The patient is ambulating well. The patient is tolerating a normal diet. Discharge Medication:      Medication List      START taking these medications    ibuprofen 800 MG tablet  Commonly known as: ADVIL;MOTRIN  Take 1 tablet by mouth every 8 hours as needed for Pain     oxyCODONE-acetaminophen 5-325 MG per tablet  Commonly known as: Percocet  Take 1 tablet by mouth every 6 hours as needed for Pain for up to 7 days. Intended supply: 7 days. Take lowest dose possible to manage pain        CHANGE how you take these medications    labetalol 200 MG tablet  Commonly known as: NORMODYNE  Take 1 tablet by mouth every 12 hours  What changed:   · medication strength  · how much to take  · when to take this        CONTINUE taking these medications    PRENATAL 1+1 PO           Where to Get Your Medications      These medications were sent to [id number] [place], [place] - [address] [place] [address] [phone] - F [phone]  [address] [place], [place] [id number]    Phone: [phone]   · ibuprofen 800 MG tablet  · labetalol 200 MG tablet  · oxyCODONE-acetaminophen 5-325 MG per tablet          Patient Instructions:    Activity: no sex for 6 weeks, no driving while on analgesics and no heavy lifting for 6 weeks  Diet: regular  Wound Care: keep wound clean and dry, reinforce dressing PRN and ice to area for comfort    Discharge Date: 22    Condition: Good    Plan:   Follow up in 1 week(s)    Electronically signed by Olivia Dobson DO on 2022 at 8:29 AM

## 2022-05-16 NOTE — FLOWSHEET NOTE
Discharge prescriptions given to pt with instructions on use and side effects. See AVS. Pt verbalized understanding of medications. Postpartum  teaching completed and forms signed by patient. Copy witnessed by RN and given to patient. Patient verbalized understanding of all teaching points. Patient plans to follow-up with [facility] Provider as instructed. Patient verbalizes understanding of discharge instructions and denies further questions. ID bands checked. Patient discharged in stable condition accompanied by family/guardian. Discharged in wheelchair, holding baby in arms.

## 2022-05-16 NOTE — PLAN OF CARE
Problem: Gastrointestinal - Adult  Goal: Minimal or absence of nausea and vomiting  2022 115 by Luciana Chery RN  Outcome: Progressing  Flowsheets (Taken 2022)  Minimal or absence of nausea and vomiting: Advance diet as tolerated, if ordered  Note: Denies any nausea or vomiting, tolerating diet well     Problem: Skin/Tissue Integrity - Adult  Goal: Skin integrity remains intact  2022 by Luciana Chery RN  Outcome: Progressing  Flowsheets (Taken 2022)  Skin Integrity Remains Intact: Monitor for areas of redness and/or skin breakdown  Note: No areas of skin breakdown assessed. Problem: Postpartum  Goal: Appropriate maternal -  bonding  Description:  Postpartum OB-Pregnancy care plan goal which identifies if the mother and  are bonding appropriately  2022 by Luciana Chery RN  Outcome: Progressing  Note: Infant has roomed in with mother this shift . Benefits of rooming in provided. Problem: Pain  Goal: Verbalizes/displays adequate comfort level or baseline comfort level  2022 by Luciana Chery RN  Outcome: Progressing  Flowsheets (Taken 2022)  Verbalizes/displays adequate comfort level or baseline comfort level:   Encourage patient to monitor pain and request assistance   Assess pain using appropriate pain scale   Administer analgesics based on type and severity of pain and evaluate response   Implement non-pharmacological measures as appropriate and evaluate response   Consider cultural and social influences on pain and pain management  Note: Abdominal incision pain manageable with oral pain medication along with an abdominal binder and ice.       Problem: Pain  Goal: Verbalizes/displays adequate comfort level or baseline comfort level  Recent Flowsheet Documentation  Taken 2022 by Luciana Chery RN  Verbalizes/displays adequate comfort level or baseline comfort level:   Encourage patient to monitor pain and request assistance   Assess pain using appropriate pain scale   Administer analgesics based on type and severity of pain and evaluate response   Implement non-pharmacological measures as appropriate and evaluate response   Consider cultural and social influences on pain and pain management   Care plan reviewed with patient and she contributes to goal setting and voices understanding of plan of care.

## 2022-05-20 ENCOUNTER — HOSPITAL ENCOUNTER (INPATIENT)
Age: 22
LOS: 1 days | Discharge: HOME | DRG: 561 | End: 2022-05-21
Attending: STUDENT IN AN ORGANIZED HEALTH CARE EDUCATION/TRAINING PROGRAM | Admitting: STUDENT IN AN ORGANIZED HEALTH CARE EDUCATION/TRAINING PROGRAM
Payer: COMMERCIAL

## 2022-05-20 PROBLEM — O26.90 PREGNANCY, COMPLICATED: Status: ACTIVE | Noted: 2022-05-20

## 2022-05-20 LAB
ALBUMIN SERPL-MCNC: 3.6 G/DL (ref 3.5–5.1)
ALP BLD-CCNC: 97 U/L (ref 38–126)
ALT SERPL-CCNC: 19 U/L (ref 11–66)
ANION GAP SERPL CALCULATED.3IONS-SCNC: 13 MEQ/L (ref 8–16)
AST SERPL-CCNC: 22 U/L (ref 5–40)
BILIRUB SERPL-MCNC: 0.2 MG/DL (ref 0.3–1.2)
BILIRUBIN URINE: NEGATIVE
BLOOD, URINE: NEGATIVE
BUN BLDV-MCNC: 10 MG/DL (ref 7–22)
CALCIUM SERPL-MCNC: 8.4 MG/DL (ref 8.5–10.5)
CHARACTER, URINE: CLEAR
CHLORIDE BLD-SCNC: 106 MEQ/L (ref 98–111)
CO2: 21 MEQ/L (ref 23–33)
COLOR: YELLOW
CREAT SERPL-MCNC: 0.5 MG/DL (ref 0.4–1.2)
CREATININE URINE: 83.1 MG/DL
ERYTHROCYTE [DISTWIDTH] IN BLOOD BY AUTOMATED COUNT: 14.3 % (ref 11.5–14.5)
ERYTHROCYTE [DISTWIDTH] IN BLOOD BY AUTOMATED COUNT: 41.7 FL (ref 35–45)
GFR SERPL CREATININE-BSD FRML MDRD: > 90 ML/MIN/1.73M2
GLUCOSE BLD-MCNC: 85 MG/DL (ref 70–108)
GLUCOSE, URINE: NEGATIVE MG/DL
HCT VFR BLD CALC: 30.2 % (ref 37–47)
HEMOGLOBIN: 9.8 GM/DL (ref 12–16)
KETONES, URINE: NEGATIVE
LEUKOCYTE EST, POC: NEGATIVE
MCH RBC QN AUTO: 26.1 PG (ref 26–33)
MCHC RBC AUTO-ENTMCNC: 32.5 GM/DL (ref 32.2–35.5)
MCV RBC AUTO: 80.5 FL (ref 81–99)
NITRITE, URINE: NEGATIVE
PH UA: 6.5 (ref 5–9)
PLATELET # BLD: 358 THOU/MM3 (ref 130–400)
PMV BLD AUTO: 9.9 FL (ref 9.4–12.4)
POTASSIUM SERPL-SCNC: 4.2 MEQ/L (ref 3.5–5.2)
PROT/CREAT RATIO, UR: 0.12
PROTEIN UA: NEGATIVE MG/DL
PROTEIN, URINE: 10.3 MG/DL
RBC # BLD: 3.75 MILL/MM3 (ref 4.2–5.4)
REASON FOR REJECTION: NORMAL
REJECTED TEST: NORMAL
SODIUM BLD-SCNC: 140 MEQ/L (ref 135–145)
SPECIFIC GRAVITY UA: 1.01 (ref 1–1.03)
TOTAL PROTEIN: 6.5 G/DL (ref 6.1–8)
UROBILINOGEN, URINE: 0.2 EU/DL (ref 0–1)
WBC # BLD: 10.5 THOU/MM3 (ref 4.8–10.8)

## 2022-05-20 PROCEDURE — 82570 ASSAY OF URINE CREATININE: CPT

## 2022-05-20 PROCEDURE — 2580000003 HC RX 258: Performed by: STUDENT IN AN ORGANIZED HEALTH CARE EDUCATION/TRAINING PROGRAM

## 2022-05-20 PROCEDURE — 6360000002 HC RX W HCPCS: Performed by: STUDENT IN AN ORGANIZED HEALTH CARE EDUCATION/TRAINING PROGRAM

## 2022-05-20 PROCEDURE — 6370000000 HC RX 637 (ALT 250 FOR IP): Performed by: STUDENT IN AN ORGANIZED HEALTH CARE EDUCATION/TRAINING PROGRAM

## 2022-05-20 PROCEDURE — 80053 COMPREHEN METABOLIC PANEL: CPT

## 2022-05-20 PROCEDURE — 84156 ASSAY OF PROTEIN URINE: CPT

## 2022-05-20 PROCEDURE — 1220000001 HC SEMI PRIVATE L&D R&B

## 2022-05-20 PROCEDURE — 85027 COMPLETE CBC AUTOMATED: CPT

## 2022-05-20 PROCEDURE — 36415 COLL VENOUS BLD VENIPUNCTURE: CPT

## 2022-05-20 PROCEDURE — 81003 URINALYSIS AUTO W/O SCOPE: CPT

## 2022-05-20 RX ORDER — NIFEDIPINE 30 MG/1
30 TABLET, EXTENDED RELEASE ORAL DAILY
Status: DISCONTINUED | OUTPATIENT
Start: 2022-05-20 | End: 2022-05-21 | Stop reason: HOSPADM

## 2022-05-20 RX ORDER — IBUPROFEN 800 MG/1
800 TABLET ORAL EVERY 8 HOURS PRN
Status: DISCONTINUED | OUTPATIENT
Start: 2022-05-20 | End: 2022-05-21 | Stop reason: HOSPADM

## 2022-05-20 RX ORDER — SODIUM CHLORIDE, SODIUM LACTATE, POTASSIUM CHLORIDE, CALCIUM CHLORIDE 600; 310; 30; 20 MG/100ML; MG/100ML; MG/100ML; MG/100ML
INJECTION, SOLUTION INTRAVENOUS CONTINUOUS
Status: DISCONTINUED | OUTPATIENT
Start: 2022-05-20 | End: 2022-05-21 | Stop reason: HOSPADM

## 2022-05-20 RX ORDER — OXYCODONE HYDROCHLORIDE AND ACETAMINOPHEN 5; 325 MG/1; MG/1
1 TABLET ORAL EVERY 6 HOURS PRN
Status: DISCONTINUED | OUTPATIENT
Start: 2022-05-20 | End: 2022-05-21 | Stop reason: HOSPADM

## 2022-05-20 RX ORDER — MAGNESIUM SULFATE IN WATER 40 MG/ML
4000 INJECTION, SOLUTION INTRAVENOUS ONCE
Status: COMPLETED | OUTPATIENT
Start: 2022-05-20 | End: 2022-05-20

## 2022-05-20 RX ADMIN — MAGNESIUM SULFATE IN WATER 2000 MG/HR: 40 INJECTION, SOLUTION INTRAVENOUS at 20:01

## 2022-05-20 RX ADMIN — MAGNESIUM SULFATE HEPTAHYDRATE 4000 MG: 40 INJECTION, SOLUTION INTRAVENOUS at 09:38

## 2022-05-20 RX ADMIN — SODIUM CHLORIDE, POTASSIUM CHLORIDE, SODIUM LACTATE AND CALCIUM CHLORIDE: 600; 310; 30; 20 INJECTION, SOLUTION INTRAVENOUS at 19:59

## 2022-05-20 RX ADMIN — MAGNESIUM SULFATE IN WATER 2000 MG/HR: 40 INJECTION, SOLUTION INTRAVENOUS at 09:53

## 2022-05-20 RX ADMIN — NIFEDIPINE 30 MG: 30 TABLET, FILM COATED, EXTENDED RELEASE ORAL at 09:47

## 2022-05-20 RX ADMIN — SODIUM CHLORIDE, POTASSIUM CHLORIDE, SODIUM LACTATE AND CALCIUM CHLORIDE: 600; 310; 30; 20 INJECTION, SOLUTION INTRAVENOUS at 09:31

## 2022-05-20 NOTE — PLAN OF CARE
Problem: Postpartum  Goal: Experiences normal postpartum course  Description:  Postpartum OB-Pregnancy care plan goal which identifies if the mother is experiencing a normal postpartum course  Outcome: Progressing   stable  Problem: Postpartum  Goal: Appropriate maternal -  bonding  Description:  Postpartum OB-Pregnancy care plan goal which identifies if the mother and  are bonding appropriately  Outcome: Progressing   stable  Problem: Postpartum  Goal: Incisions, wounds, or drain sites healing without S/S of infection  Outcome: Progressing   healing  Problem: Pain  Goal: Verbalizes/displays adequate comfort level or baseline comfort level  Outcome: Progressing  Flowsheets (Taken 2022 0917)  Verbalizes/displays adequate comfort level or baseline comfort level:   Encourage patient to monitor pain and request assistance   Administer analgesics based on type and severity of pain and evaluate response   Assess pain using appropriate pain scale   Implement non-pharmacological measures as appropriate and evaluate response     Problem: Infection - Adult  Goal: Absence of infection during hospitalization  Outcome: Progressing   afebrile  Problem: Safety - Adult  Goal: Free from fall injury  Outcome: Progressing   No falls  Problem: Discharge Planning  Goal: Discharge to home or other facility with appropriate resources  Outcome: Progressing   Verbalizes understanding  Problem: Chronic Conditions and Co-morbidities  Goal: Patient's chronic conditions and co-morbidity symptoms are monitored and maintained or improved  Outcome: Progressing   Gestational hypertension  Care plan reviewed with patient and Lu Liriano. Patient and Lu Liriano verbalize understanding of the plan of care and contribute to goal setting.

## 2022-05-20 NOTE — FLOWSHEET NOTE
Pt arrives ambulatory from dr Ara Espinoza office, doctor called prior to her arrival with report and orders. Pt had cbirth on 5/14/22, was taking labetalol 200 mg bid at home, last took it last night, went to office today for routine follow up and bp was elevated at 174/112 and 184/118, pt denies headache, blurred vision and epigastric pain, slightly swollen, abdominal incision healed very well, open to air, no binder on.  Plan of care discussed with pt

## 2022-05-20 NOTE — H&P
[address] [address]  History and Physicial      Patient:  Vale Carmichael  YOB: 2000  MRN: [id number]     Acct: [de-identified]    HISTORY OF PRESENT ILLNESS:     Vale Carmichael is a 24 y.o. female  s/p PCS on  for failure to progress. She was induced for worsening cHTN. Was discharged home on  with BPs controlled on Labetalol 200mg BID. Pt came to the office today for BP and incision check. BPs were severely elevated 170s/110s and 180s/110s. Denies symptoms of HA, vision changes, CP, SOB, abdominal pain. She has been taking her Labetalol as prescribed but had not had her dose yet this AM when she was in the office. Obstetric History: # 1 - Date: None, Sex: None, Weight: None, GA: None, Delivery: None, Apgar1: None, Apgar5: None, Living: None, Birth Comments: None    # 2 - Date: 22, Sex: Female, Weight: 5 lb 11.4 oz (2.59 kg), GA: 37w2d, Delivery: , Low Transverse, Apgar1: 8, Apgar5: 9, Living: Living, Birth Comments: None    # 3 - Date: None, Sex: None, Weight: None, GA: None, Delivery: None, Apgar1: None, Apgar5: None, Living: None, Birth Comments: None      Past Medical History:        Diagnosis Date    Abnormal Pap smear of cervix     Allergic     Hypertension     on Labetalol 20 mg BID    Trichomonosis        Past Surgical History:        Procedure Laterality Date     SECTION N/A 2022     SECTION performed by Ana Ocampo MD at [facility] OR    EYE SURGERY Left        Medications: Scheduled Meds:   NIFEdipine  30 mg Oral Daily     Continuous Infusions:   lactated ringers 75 mL/hr at 22 0931    magnesium sulfate 2,000 mg/hr (22 0953)     PRN Meds:.ibuprofen, oxyCODONE-acetaminophen    Allergies:  Bactrim [sulfamethoxazole-trimethoprim], Lisinopril, and Seasonal    Social History:    reports that she has never smoked. She has never used smokeless tobacco. She reports current drug use. Drug: Marijuana Lovena Mems). She reports that she does not drink alcohol.     Family History:       Problem Relation Age of Onset    High Blood Pressure Father     Heart Disease Maternal Grandfather     High Blood Pressure Paternal Grandmother     Diabetes Other         MGGM    High Blood Pressure Other         MGGM       Review of Systems:  General ROS: negative  Respiratory ROS: negative  Cardiovascular ROS: negative  Gastrointestinal ROS: negative  Musculoskeletal ROS: negative  Neurological ROS: negative    Physical Exam:    Vitals:  Patient Vitals for the past 24 hrs:   BP Temp Temp src Pulse Resp SpO2 Height Weight   05/20/22 1400 134/79 -- -- 80 18 100 % -- --   05/20/22 1325 -- -- -- -- -- 97 % -- --   05/20/22 1320 (!) 158/88 -- -- 75 -- -- -- --   05/20/22 1301 (!) 172/98 -- -- 63 18 -- -- --   05/20/22 1300 -- -- -- -- -- 99 % -- --   05/20/22 1200 (!) 142/96 -- -- 66 18 100 % -- --   05/20/22 1100 (!) 148/96 97.2 °F (36.2 °C) -- 66 18 100 % -- --   05/20/22 1055 -- -- -- -- -- 100 % -- --   05/20/22 1053 (!) 141/90 -- -- 66 -- -- -- --   05/20/22 1021 (!) 148/94 -- -- 65 18 -- -- --   05/20/22 1020 -- -- -- -- -- 100 % -- --   05/20/22 1016 (!) 151/95 -- -- 65 -- -- -- --   05/20/22 1011 (!) 146/91 -- -- 65 -- -- -- --   05/20/22 1010 -- -- -- -- -- 100 % -- --   05/20/22 1006 (!) 158/97 -- -- 71 -- -- -- --   05/20/22 1001 (!) 153/96 -- -- 65 18 -- -- --   05/20/22 1000 (!) 153/96 -- -- 60 18 100 % -- --   05/20/22 0956 (!) 146/89 -- -- 60 -- -- -- --   05/20/22 0951 139/84 -- -- 65 -- -- -- --   05/20/22 0946 (!) 154/91 -- -- 70 -- -- -- --   05/20/22 0942 (!) 166/98 -- -- 63 -- -- -- --   05/20/22 0933 (!) 177/103 -- -- 56 -- -- -- --   05/20/22 0917 (!) 188/113 97.5 °F (36.4 °C) Oral 58 18 -- 5' 5\" (1.651 m) 217 lb (98.4 kg)          Wt Readings from Last 1 Encounters:   05/20/22 217 lb (98.4 kg)         General appearance - alert, well appearing, and in no distress  Abdomen - soft, nontender, nondistended, no masses or organomegaly, incision healing well  Neurological - DTR's normal and symmetric  Extremities - no edema, redness or tenderness in the calves or thighs  Skin - normal coloration and turgor, no rashes, no suspicious skin lesions noted      Review of Labs and Diagnostic Testing:      CBC:   Lab Results   Component Value Date    WBC 10.5 2022    RBC 3.75 2022    RBC 4.50 2021    HGB 9.8 2022    HCT 30.2 2022    MCV 80.5 2022    RDW 13.5 2021     2022     CMP:    Lab Results   Component Value Date     2022    K 4.2 2022     2022    CO2 21 2022    BUN 10 2022    PROT 6.5 2022     Pr/Cr: 0.12      Assessment:    Patient Active Problem List   Diagnosis    Essential hypertension    Obesity (BMI 30-39. 9)    Encounter for elective induction of labor    Hypertension complicating pregnancy, third trimester    S/P     Pregnancy, complicated     Postpartum Pre-E    Plan:  - Admit to L&D  - Will give magnesium for seizure ppx. 4g bolus then 2g/hr.   - Pre-E labs  - Will d/c Labetalol and will start Procardia 30XL daily  - Continue to closely monitor BPs    Annie Fraire DO

## 2022-05-20 NOTE — PLAN OF CARE
Problem: Postpartum  Goal: Experiences normal postpartum course  Description:  Postpartum OB-Pregnancy care plan goal which identifies if the mother is experiencing a normal postpartum course  2022 by Afsaneh Maxwell RN  Outcome: Progressing  Flowsheets (Taken 2022)  Experiences Normal Postpartum Course: Monitor maternal vital signs     Problem: Postpartum  Goal: Appropriate maternal -  bonding  Description:  Postpartum OB-Pregnancy care plan goal which identifies if the mother and  are bonding appropriately  2022 by Afsaneh Maxwell RN  Outcome: Progressing     Problem: Postpartum  Goal: Incisions, wounds, or drain sites healing without S/S of infection  2022 by Afsaneh Maxwell RN  Outcome: Progressing     Problem: Pain  Goal: Verbalizes/displays adequate comfort level or baseline comfort level  2022 by Afsaneh Maxwell RN  Outcome: Progressing  Flowsheets (Taken 2022 by Marlyn Carranza RN)  Verbalizes/displays adequate comfort level or baseline comfort level:   Encourage patient to monitor pain and request assistance   Administer analgesics based on type and severity of pain and evaluate response   Assess pain using appropriate pain scale   Implement non-pharmacological measures as appropriate and evaluate response     Problem: Infection - Adult  Goal: Absence of infection during hospitalization  2022 by Afsaneh Maxwell RN  Outcome: Progressing  Flowsheets (Taken 2022)  Absence of infection during hospitalization: Assess and monitor for signs and symptoms of infection     Problem: Safety - Adult  Goal: Free from fall injury  2022 by Afsaneh Maxwell RN  Outcome: Progressing  Flowsheets (Taken 2022)  Free From Fall Injury: Instruct family/caregiver on patient safety     Problem: Discharge Planning  Goal: Discharge to home or other facility with appropriate resources  2022 by Owen Martinez RN  Outcome: Progressing  Flowsheets (Taken 5/20/2022 1930)  Discharge to home or other facility with appropriate resources: Identify barriers to discharge with patient and caregiver     Problem: Chronic Conditions and Co-morbidities  Goal: Patient's chronic conditions and co-morbidity symptoms are monitored and maintained or improved  5/20/2022 1930 by Owen Martinez RN  Outcome: Progressing  Flowsheets (Taken 5/20/2022 1930)  Care Plan - Patient's Chronic Conditions and Co-Morbidity Symptoms are Monitored and Maintained or Improved: Monitor and assess patient's chronic conditions and comorbid symptoms for stability, deterioration, or improvement     Care plan reviewed with patient and boyfriend. Patient and boyfriend verbalize understanding of the plan of care and contribute to goal setting.

## 2022-05-21 VITALS
WEIGHT: 206 LBS | BODY MASS INDEX: 34.32 KG/M2 | TEMPERATURE: 98.8 F | HEART RATE: 98 BPM | SYSTOLIC BLOOD PRESSURE: 158 MMHG | DIASTOLIC BLOOD PRESSURE: 100 MMHG | HEIGHT: 65 IN | RESPIRATION RATE: 16 BRPM | OXYGEN SATURATION: 98 %

## 2022-05-21 PROCEDURE — 2580000003 HC RX 258: Performed by: STUDENT IN AN ORGANIZED HEALTH CARE EDUCATION/TRAINING PROGRAM

## 2022-05-21 PROCEDURE — 6360000002 HC RX W HCPCS: Performed by: STUDENT IN AN ORGANIZED HEALTH CARE EDUCATION/TRAINING PROGRAM

## 2022-05-21 PROCEDURE — 6370000000 HC RX 637 (ALT 250 FOR IP): Performed by: STUDENT IN AN ORGANIZED HEALTH CARE EDUCATION/TRAINING PROGRAM

## 2022-05-21 RX ORDER — NIFEDIPINE 10 MG/1
10 CAPSULE ORAL EVERY 8 HOURS SCHEDULED
Status: DISCONTINUED | OUTPATIENT
Start: 2022-05-21 | End: 2022-05-21 | Stop reason: HOSPADM

## 2022-05-21 RX ORDER — NIFEDIPINE 30 MG/1
30 TABLET, EXTENDED RELEASE ORAL DAILY
Qty: 30 TABLET | Refills: 3 | Status: SHIPPED | OUTPATIENT
Start: 2022-05-21 | End: 2022-05-21

## 2022-05-21 RX ORDER — NIFEDIPINE 30 MG/1
60 TABLET, EXTENDED RELEASE ORAL DAILY
Qty: 60 TABLET | Refills: 3 | Status: SHIPPED | OUTPATIENT
Start: 2022-05-21

## 2022-05-21 RX ORDER — NIFEDIPINE 10 MG/1
CAPSULE ORAL
Status: DISCONTINUED
Start: 2022-05-21 | End: 2022-05-21 | Stop reason: WASHOUT

## 2022-05-21 RX ORDER — NIFEDIPINE 10 MG/1
CAPSULE ORAL
Status: DISCONTINUED
Start: 2022-05-21 | End: 2022-05-21 | Stop reason: HOSPADM

## 2022-05-21 RX ORDER — HYDRALAZINE HYDROCHLORIDE 20 MG/ML
5 INJECTION INTRAMUSCULAR; INTRAVENOUS ONCE
Status: COMPLETED | OUTPATIENT
Start: 2022-05-21 | End: 2022-05-21

## 2022-05-21 RX ORDER — HYDRALAZINE HYDROCHLORIDE 20 MG/ML
INJECTION INTRAMUSCULAR; INTRAVENOUS
Status: DISCONTINUED
Start: 2022-05-21 | End: 2022-05-21 | Stop reason: HOSPADM

## 2022-05-21 RX ADMIN — HYDRALAZINE HYDROCHLORIDE 5 MG: 20 INJECTION, SOLUTION INTRAMUSCULAR; INTRAVENOUS at 13:55

## 2022-05-21 RX ADMIN — NIFEDIPINE 30 MG: 30 TABLET, FILM COATED, EXTENDED RELEASE ORAL at 09:00

## 2022-05-21 RX ADMIN — MAGNESIUM SULFATE IN WATER 2000 MG/HR: 40 INJECTION, SOLUTION INTRAVENOUS at 06:16

## 2022-05-21 RX ADMIN — SODIUM CHLORIDE, POTASSIUM CHLORIDE, SODIUM LACTATE AND CALCIUM CHLORIDE: 600; 310; 30; 20 INJECTION, SOLUTION INTRAVENOUS at 08:18

## 2022-05-21 ASSESSMENT — PAIN SCALES - GENERAL: PAINLEVEL_OUTOF10: 0

## 2022-05-21 NOTE — PROGRESS NOTES
19yo [id number] s/p PCS on 5/14, currently admitted for cHTN with SI pre-e postpartum  Pt doing well this AM.  BPs have been mostly 140s/80s since admission. Pt denies HA, vision changes, CP, SOB, abdominal pain.     Vitals:    05/21/22 0815   BP: (!) 143/87   Pulse: 96   Resp:    Temp:    SpO2: 100%        PE:  Gen: NAD, resting comfortably  Abd: soft, nontender, nondistended, incision healing well  Extremities: No edema, SCDs in place    A&P:  - Magnesium due to come off around 930 AM  - Continue Procardia 30XL  - UOP adequate  - Discussed with pt that if BPs remain normal to mildly elevated, may consider discharge late afternoon with follow up for BP next week

## 2022-05-21 NOTE — FLOWSHEET NOTE
Discharged by wheelchair to private car driven by [employer] and infant female already in the car with [employer].

## 2022-05-21 NOTE — FLOWSHEET NOTE
SCD's off and up ambulating in the hallway. Significant other pushing infant in luna as well.   Weight 206

## 2022-05-21 NOTE — FLOWSHEET NOTE
Dr. Liane Cushing spoke with Jake Alex RN and reinforced that patient will be signing out against medical advice and that she will need to  her procardia 60 mg XL at her pharmacy and verbalizes understanding.

## 2022-05-21 NOTE — FLOWSHEET NOTE
Dr. Christina Ramirez states to stop magnesium 24 hours after it was started and to have patient up out of bed and observe blood pressures and possible for discharge late this afternoon depending on the blood pressures.

## 2022-05-21 NOTE — FLOWSHEET NOTE
Denies headache, visual disturbances, epigastric pain or heartburn or nausea or pain. Discussed that if goes home then will need to sign a paper that is leaving medical center against medical advice by Dr. Miguel Rehman and verbalizes understanding and that when spoke to Dr. Liane Cushing that Dr. Liane Cushing told her she would need to sign that paper. Infant female and J'Aun at side.

## 2022-05-21 NOTE — FLOWSHEET NOTE
Resting in bed and IV of LR infuses at 125 ml's per hour in left arm and Magnesium Sulfate infuses well at 2 grams per hour IVPB. Blanco is patent and drains clear yellow urine. Infant daughter at side and significant other at side. Ice water at side and ice chips given and wants to rest.  Seizure precautions maintained.

## 2022-05-21 NOTE — FLOWSHEET NOTE
SCD's off and assisted to the bathroom to void. Gait steady and tolerated well. Harini care done in the bathroom. Infant female at side and tolerates well.

## 2022-05-21 NOTE — PLAN OF CARE
Problem: Postpartum  Goal: Experiences normal postpartum course  Description:  Postpartum OB-Pregnancy care plan goal which identifies if the mother is experiencing a normal postpartum course  2022 by Eduardo Elise RN  Outcome: Progressing  Flowsheets (Taken 2022 by Erika Brown RN)  Experiences Normal Postpartum Course: Monitor maternal vital signs  Note: Monitoring vital signs and magnesium sulfate therapy continued. 2022 by Erika Brown RN  Outcome: Progressing  Flowsheets (Taken 2022)  Experiences Normal Postpartum Course: Monitor maternal vital signs     Problem: Postpartum  Goal: Appropriate maternal -  bonding  Description:  Postpartum OB-Pregnancy care plan goal which identifies if the mother and  are bonding appropriately  2022 by Eduardo Elise RN  Outcome: Progressing  Note: Infant female is at side and condition is good. Bonding with infant well.  2022 by Erika Brown RN  Outcome: Progressing     Problem: Postpartum  Goal: Incisions, wounds, or drain sites healing without S/S of infection  2022 by Eduardo Elise RN  Outcome: Progressing  Flowsheets (Taken 2022)  Incisions, Wounds, or Drain Sites Healing Without Sign and Symptoms of Infection: TWICE DAILY: Assess and document skin integrity  Note: No skin breakdown and abdominal incision is intact and healing well.   Abdominal incision is open to the air.  2022 by Erika Brown RN  Outcome: Progressing     Problem: Pain  Goal: Verbalizes/displays adequate comfort level or baseline comfort level  2022 by Eduardo Elise RN  Outcome: Progressing  Flowsheets (Taken 2022)  Verbalizes/displays adequate comfort level or baseline comfort level:   Assess pain using appropriate pain scale   Administer analgesics based on type and severity of pain and evaluate response   Consider cultural and social influences on pain and pain management  Note: Denies pain and pain goal is 0 and will continue to monitor. 5/20/2022 1930 by Marie Sorenson RN  Outcome: Progressing  Flowsheets (Taken 5/20/2022 0917 by Charley Sood RN)  Verbalizes/displays adequate comfort level or baseline comfort level:   Encourage patient to monitor pain and request assistance   Administer analgesics based on type and severity of pain and evaluate response   Assess pain using appropriate pain scale   Implement non-pharmacological measures as appropriate and evaluate response     Problem: Infection - Adult  Goal: Absence of infection during hospitalization  5/21/2022 0754 by Patrice Salinas RN  Outcome: Progressing  Flowsheets (Taken 5/21/2022 0754)  Absence of infection during hospitalization:   Assess and monitor for signs and symptoms of infection   Monitor all insertion sites i.e., indwelling lines, tubes and drains   Administer medications as ordered   Instruct and encourage patient and family to use good hand hygiene technique  Note: Afebrile and no odor to the amniotic fluid vaginally. 5/20/2022 1930 by Marie Sorenson RN  Outcome: Progressing  Flowsheets (Taken 5/20/2022 1930)  Absence of infection during hospitalization: Assess and monitor for signs and symptoms of infection     Problem: Safety - Adult  Goal: Free from fall injury  5/21/2022 0754 by Patrice Salinas RN  Outcome: Progressing  Flowsheets (Taken 5/20/2022 1930 by Marie Sorenson RN)  Free From Fall Injury: Instruct family/caregiver on patient safety  Note: Patient is on bedrest and will continue that if ordered and if the physician changes the order then will assist up to the bathroom with help about once or twice and then if no dizziness will assist up to the bathroom as needed.   5/20/2022 1930 by Marie Sorenson RN  Outcome: Progressing  Flowsheets (Taken 5/20/2022 1930)  Free From Fall Injury: Instruct family/caregiver on patient safety     Problem: Discharge Planning  Goal: Discharge to home or other facility with appropriate resources  5/21/2022 0754 by Bogdan Mora RN  Outcome: Progressing  Flowsheets (Taken 5/20/2022 1930 by Kellie Darden RN)  Discharge to home or other facility with appropriate resources: Identify barriers to discharge with patient and caregiver  Note: Discharge will be planned after patient is off magnesium sulfate and when Dr. Lan Matthew orders discharge and until then will continue plan of care in labor and delivery. 5/20/2022 1930 by Kellie Darden RN  Outcome: Progressing  Flowsheets (Taken 5/20/2022 1930)  Discharge to home or other facility with appropriate resources: Identify barriers to discharge with patient and caregiver     Problem: Chronic Conditions and Co-morbidities  Goal: Patient's chronic conditions and co-morbidity symptoms are monitored and maintained or improved  5/21/2022 0754 by Bogdan Mora RN  Outcome: Progressing  Flowsheets (Taken 5/21/2022 0754)  Care Plan - Patient's Chronic Conditions and Co-Morbidity Symptoms are Monitored and Maintained or Improved: Monitor and assess patient's chronic conditions and comorbid symptoms for stability, deterioration, or improvement  Note: Continue present plan of care with patient for observation in labor and delivery and magnesium sulfate for elevated blood pressure. 5/20/2022 1930 by Kellie Darden RN  Outcome: Progressing  Flowsheets (Taken 5/20/2022 1930)  Care Plan - Patient's Chronic Conditions and Co-Morbidity Symptoms are Monitored and Maintained or Improved: Monitor and assess patient's chronic conditions and comorbid symptoms for stability, deterioration, or improvement   Care plan reviewed with patient and verbalize understanding of the plan of care and contribute to goal setting.

## 2022-05-21 NOTE — FLOWSHEET NOTE
Plan of care discussed for IV medication and and states wants to go home and Seema Del Castillo states the high blood pressure is her normal and she is crying to go home and it is making her blood pressure high. Discussed that the high blood pressure could cause a seizure and they both state that they are going home. Informed Dr. Telma Rush and I are concerned about being safe and trying to get the blood pressure within normal range and says I have no other symptom. Denies headache and visual disturbances and epigastric pain or nausea. Still wants to go home and cannot make us stay. Is more comfortable going home and does not want to be here.

## 2022-05-21 NOTE — FLOWSHEET NOTE
Dr. Babar Sherman transferred into the room per Dr. Babar Sherman request and spoke to the patient on the telephone and then patient still wants to go home. Dr. Babar Sherman spoke with the patient that she will be leaving against medical advice.

## 2022-05-21 NOTE — PROGRESS NOTES
Was notified by RN of severe BPs in the [id number] systolic. Discussed with pt this morning that if BPs were not well controlled today I would like to keep her in the hospital until we had them well controlled. Gave orders to give 5mg Hydralazine IV and to let pt know she would not be able to go home today since BP meds will need to be adjusted. Patient informed nurse she does not want to stay and just wants to go home. I was transferred to phone in patient's room and talked to the patient about her severe BPs and that she is at risk for stroke and seizures with BPs in that range. Pt says she has no other symptoms, feels fine and she is not going to stay. Pt verbalized understanding risks of leaving against medical advice. She was advised to  Procardia 60XL at pharmacy and to call office on Monday for BP check this coming week.     [address], DO

## 2022-05-23 NOTE — DISCHARGE SUMMARY
Discharge Summary    Admitting diagnosis: Postpartum pre-e, s/p primary C/S on . Date of Admission: 2022  9:02 AM      Type of Delivery:  section on     Labs: CBC   Lab Results   Component Value Date    WBC 10.5 2022    HGB 9.8 (L) 2022    HCT 30.2 (L) 2022     2022        Postpartum complications: preeclampsia    The patient received 24hrs magnesium sulfate for seizure ppx. She was switched from Labetalol to Procardia 30mg XL. After magnesium discontinued and pt started to get up walking around more her BPs were still uncontrolled and in severe range. Discussed with patient the need to increase BP meds and continue to monitor BPs inpatient. Pt refused to stay in the hospital and left AMA on . Discharge Medication:      Medication List      START taking these medications    NIFEdipine 30 MG extended release tablet  Commonly known as: PROCARDIA XL  Take 2 tablets by mouth daily        CONTINUE taking these medications    ibuprofen 800 MG tablet  Commonly known as: ADVIL;MOTRIN  Take 1 tablet by mouth every 8 hours as needed for Pain     oxyCODONE-acetaminophen 5-325 MG per tablet  Commonly known as: Percocet  Take 1 tablet by mouth every 6 hours as needed for Pain for up to 7 days. Intended supply: 7 days. Take lowest dose possible to manage pain     PRENATAL 1+1 PO        STOP taking these medications    labetalol 200 MG tablet  Commonly known as: Manav Rolling           Where to Get Your Medications      These medications were sent to [facility]/pharmacy #[id number]- [place], [place] - [address] [facility] [place] [phone]  [address] [place] [address],[address]    Phone: [phone]   · NIFEdipine 30 MG extended release tablet          Patient Instructions: Activity: no sex for 6 weeks and no heavy lifting for 6 weeks  Diet: regular  Wound Care: keep wound clean and dry and ice to area for comfort   new BP script at pharmacy.  Call if any symptoms of HA, vision changes, CP, SOB, abdominal pain. Discharge date: left AMA on 5/21      Plan:   Follow up next week in office. Procardia 60mg XL sent to pharmacy.     Electronically signed by Baljeet Sanchez DO on 5/23/2022 at 8:08 AM

## 2023-06-26 ENCOUNTER — NURSE ONLY (OUTPATIENT)
Dept: LAB | Age: 23
End: 2023-06-26

## 2023-06-30 LAB
C TRACH RRNA SPEC QL NAA+PROBE: NEGATIVE
N GONORRHOEA RRNA SPEC QL NAA+PROBE: NEGATIVE
SPEC CONTAINER SPEC: NORMAL
SPECIMEN SOURCE: NORMAL
SPECIMEN SOURCE: NORMAL
T VAGINALIS RRNA SPEC QL NAA+PROBE: NEGATIVE

## (undated) DEVICE — SUTURE PLN GUT SZ 2-0 L27IN ABSRB YELLOWISH TAN L36MM CT-1 843H

## (undated) DEVICE — SUTURE VCRL + SZ 0 L36IN ABSRB VLT L36MM CT-1 1/2 CIR VCP346H

## (undated) DEVICE — SUTURE VCRL + SZ 2-0 L27IN ABSRB CLR CT-1 1/2 CIR TAPERCUT VCP259H

## (undated) DEVICE — APPLICATOR PREP 26ML 0.7% IOD POVACRYLEX 74% ISO ALC ST

## (undated) DEVICE — PACK PROCEDURE SURG C SECT SRMC LF

## (undated) DEVICE — GLOVE SURG SZ 6 L12IN FNGR THK94MIL STD WHT ISOLEX LTX FREE